# Patient Record
Sex: FEMALE | Race: WHITE | NOT HISPANIC OR LATINO | Employment: OTHER | URBAN - METROPOLITAN AREA
[De-identification: names, ages, dates, MRNs, and addresses within clinical notes are randomized per-mention and may not be internally consistent; named-entity substitution may affect disease eponyms.]

---

## 2019-02-12 PROBLEM — Z87.19 HISTORY OF LIVER FAILURE: Status: ACTIVE | Noted: 2019-02-12

## 2019-02-27 PROBLEM — F31.9 BIPOLAR DISORDER (HCC): Status: ACTIVE | Noted: 2019-02-27

## 2019-02-27 PROBLEM — K80.20 CHOLELITHIASIS: Status: ACTIVE | Noted: 2019-02-27

## 2019-03-05 PROBLEM — F31.62 BIPOLAR DISORDER, CURRENT EPISODE MIXED, MODERATE (HCC): Status: ACTIVE | Noted: 2019-03-05

## 2019-03-18 PROBLEM — K80.20 CHOLELITHIASIS: Status: RESOLVED | Noted: 2019-02-27 | Resolved: 2019-03-18

## 2019-04-25 ENCOUNTER — HOSPITAL ENCOUNTER (INPATIENT)
Facility: MEDICAL CENTER | Age: 39
LOS: 3 days | DRG: 682 | End: 2019-04-29
Attending: EMERGENCY MEDICINE | Admitting: INTERNAL MEDICINE
Payer: MEDICARE

## 2019-04-25 ENCOUNTER — HOSPITAL ENCOUNTER (OUTPATIENT)
Facility: MEDICAL CENTER | Age: 39
End: 2019-04-25

## 2019-04-25 VITALS — BODY MASS INDEX: 30.61 KG/M2 | WEIGHT: 213.85 LBS | HEIGHT: 70 IN

## 2019-04-25 DIAGNOSIS — N17.9 AKI (ACUTE KIDNEY INJURY) (HCC): ICD-10-CM

## 2019-04-25 DIAGNOSIS — F31.10 BIPOLAR AFFECTIVE DISORDER, CURRENT EPISODE MANIC, CURRENT EPISODE SEVERITY UNSPECIFIED (HCC): ICD-10-CM

## 2019-04-25 DIAGNOSIS — T56.891A LITHIUM TOXICITY, ACCIDENTAL OR UNINTENTIONAL, INITIAL ENCOUNTER: ICD-10-CM

## 2019-04-25 DIAGNOSIS — F31.62 BIPOLAR DISORDER, CURRENT EPISODE MIXED, MODERATE (HCC): ICD-10-CM

## 2019-04-25 PROBLEM — M54.9 CHRONIC BACK PAIN: Status: ACTIVE | Noted: 2019-04-25

## 2019-04-25 PROBLEM — R45.851 SUICIDAL IDEATION: Status: ACTIVE | Noted: 2019-04-25

## 2019-04-25 PROBLEM — G89.29 CHRONIC BACK PAIN: Status: ACTIVE | Noted: 2019-04-25

## 2019-04-25 PROBLEM — N17.0 ATN (ACUTE TUBULAR NECROSIS) (HCC): Status: ACTIVE | Noted: 2019-04-25

## 2019-04-25 LAB
ANION GAP SERPL CALC-SCNC: 7 MMOL/L (ref 0–11.9)
BASOPHILS # BLD AUTO: 0.2 % (ref 0–1.8)
BASOPHILS # BLD: 0.02 K/UL (ref 0–0.12)
BUN SERPL-MCNC: 23 MG/DL (ref 8–22)
CALCIUM SERPL-MCNC: 8.9 MG/DL (ref 8.5–10.5)
CHLORIDE SERPL-SCNC: 108 MMOL/L (ref 96–112)
CO2 SERPL-SCNC: 22 MMOL/L (ref 20–33)
CREAT SERPL-MCNC: 2.22 MG/DL (ref 0.5–1.4)
EOSINOPHIL # BLD AUTO: 0.37 K/UL (ref 0–0.51)
EOSINOPHIL NFR BLD: 4.2 % (ref 0–6.9)
ERYTHROCYTE [DISTWIDTH] IN BLOOD BY AUTOMATED COUNT: 50.2 FL (ref 35.9–50)
GLUCOSE SERPL-MCNC: 109 MG/DL (ref 65–99)
HCT VFR BLD AUTO: 37 % (ref 37–47)
HGB BLD-MCNC: 11.7 G/DL (ref 12–16)
IMM GRANULOCYTES # BLD AUTO: 0.02 K/UL (ref 0–0.11)
IMM GRANULOCYTES NFR BLD AUTO: 0.2 % (ref 0–0.9)
LITHIUM SERPL-MCNC: 2.6 MMOL/L (ref 0.6–1.2)
LYMPHOCYTES # BLD AUTO: 1.36 K/UL (ref 1–4.8)
LYMPHOCYTES NFR BLD: 15.5 % (ref 22–41)
MCH RBC QN AUTO: 26.9 PG (ref 27–33)
MCHC RBC AUTO-ENTMCNC: 31.6 G/DL (ref 33.6–35)
MCV RBC AUTO: 85.1 FL (ref 81.4–97.8)
MONOCYTES # BLD AUTO: 0.56 K/UL (ref 0–0.85)
MONOCYTES NFR BLD AUTO: 6.4 % (ref 0–13.4)
NEUTROPHILS # BLD AUTO: 6.44 K/UL (ref 2–7.15)
NEUTROPHILS NFR BLD: 73.5 % (ref 44–72)
NRBC # BLD AUTO: 0 K/UL
NRBC BLD-RTO: 0 /100 WBC
PLATELET # BLD AUTO: 165 K/UL (ref 164–446)
PMV BLD AUTO: 11.4 FL (ref 9–12.9)
POTASSIUM SERPL-SCNC: 3.1 MMOL/L (ref 3.6–5.5)
RBC # BLD AUTO: 4.35 M/UL (ref 4.2–5.4)
SODIUM SERPL-SCNC: 137 MMOL/L (ref 135–145)
WBC # BLD AUTO: 8.8 K/UL (ref 4.8–10.8)

## 2019-04-25 PROCEDURE — 80048 BASIC METABOLIC PNL TOTAL CA: CPT | Mod: 91

## 2019-04-25 PROCEDURE — 51702 INSERT TEMP BLADDER CATH: CPT

## 2019-04-25 PROCEDURE — 93005 ELECTROCARDIOGRAM TRACING: CPT | Performed by: EMERGENCY MEDICINE

## 2019-04-25 PROCEDURE — 99285 EMERGENCY DEPT VISIT HI MDM: CPT

## 2019-04-25 PROCEDURE — 84443 ASSAY THYROID STIM HORMONE: CPT

## 2019-04-25 PROCEDURE — 36415 COLL VENOUS BLD VENIPUNCTURE: CPT

## 2019-04-25 PROCEDURE — 85025 COMPLETE CBC W/AUTO DIFF WBC: CPT | Mod: 91

## 2019-04-25 PROCEDURE — 84439 ASSAY OF FREE THYROXINE: CPT

## 2019-04-25 PROCEDURE — G0378 HOSPITAL OBSERVATION PER HR: HCPCS

## 2019-04-25 PROCEDURE — 700105 HCHG RX REV CODE 258: Performed by: INTERNAL MEDICINE

## 2019-04-25 PROCEDURE — 303105 HCHG CATHETER EXTRA

## 2019-04-25 PROCEDURE — 700111 HCHG RX REV CODE 636 W/ 250 OVERRIDE (IP): Performed by: EMERGENCY MEDICINE

## 2019-04-25 PROCEDURE — 80178 ASSAY OF LITHIUM: CPT | Mod: 91

## 2019-04-25 RX ORDER — POTASSIUM CHLORIDE 7.45 MG/ML
10 INJECTION INTRAVENOUS ONCE
Status: COMPLETED | OUTPATIENT
Start: 2019-04-25 | End: 2019-04-26

## 2019-04-25 RX ORDER — SODIUM CHLORIDE 9 MG/ML
INJECTION, SOLUTION INTRAVENOUS CONTINUOUS
Status: CANCELLED | OUTPATIENT
Start: 2019-04-25

## 2019-04-25 RX ORDER — HEPARIN SODIUM 5000 [USP'U]/ML
5000 INJECTION, SOLUTION INTRAVENOUS; SUBCUTANEOUS EVERY 8 HOURS
Status: DISCONTINUED | OUTPATIENT
Start: 2019-04-26 | End: 2019-04-29 | Stop reason: HOSPADM

## 2019-04-25 RX ORDER — BISACODYL 10 MG
10 SUPPOSITORY, RECTAL RECTAL
Status: DISCONTINUED | OUTPATIENT
Start: 2019-04-25 | End: 2019-04-29 | Stop reason: HOSPADM

## 2019-04-25 RX ORDER — AMOXICILLIN 250 MG
2 CAPSULE ORAL 2 TIMES DAILY
Status: DISCONTINUED | OUTPATIENT
Start: 2019-04-26 | End: 2019-04-29 | Stop reason: HOSPADM

## 2019-04-25 RX ORDER — POLYETHYLENE GLYCOL 3350 17 G/17G
1 POWDER, FOR SOLUTION ORAL
Status: DISCONTINUED | OUTPATIENT
Start: 2019-04-25 | End: 2019-04-29 | Stop reason: HOSPADM

## 2019-04-25 RX ORDER — POTASSIUM CHLORIDE 20 MEQ/1
40 TABLET, EXTENDED RELEASE ORAL ONCE
Status: COMPLETED | OUTPATIENT
Start: 2019-04-26 | End: 2019-04-26

## 2019-04-25 RX ORDER — SODIUM CHLORIDE AND POTASSIUM CHLORIDE 150; 900 MG/100ML; MG/100ML
INJECTION, SOLUTION INTRAVENOUS CONTINUOUS
Status: DISCONTINUED | OUTPATIENT
Start: 2019-04-26 | End: 2019-04-26

## 2019-04-25 RX ORDER — ACETAMINOPHEN 325 MG/1
650 TABLET ORAL EVERY 6 HOURS PRN
Status: DISCONTINUED | OUTPATIENT
Start: 2019-04-25 | End: 2019-04-27

## 2019-04-25 RX ORDER — SODIUM CHLORIDE 9 MG/ML
INJECTION, SOLUTION INTRAVENOUS CONTINUOUS
Status: DISCONTINUED | OUTPATIENT
Start: 2019-04-25 | End: 2019-04-26

## 2019-04-25 RX ADMIN — SODIUM CHLORIDE: 9 INJECTION, SOLUTION INTRAVENOUS at 21:15

## 2019-04-25 RX ADMIN — POTASSIUM CHLORIDE 10 MEQ: 7.46 INJECTION, SOLUTION INTRAVENOUS at 23:53

## 2019-04-25 ASSESSMENT — LIFESTYLE VARIABLES: DO YOU DRINK ALCOHOL: NO

## 2019-04-26 ENCOUNTER — APPOINTMENT (OUTPATIENT)
Dept: RADIOLOGY | Facility: MEDICAL CENTER | Age: 39
DRG: 682 | End: 2019-04-26
Attending: STUDENT IN AN ORGANIZED HEALTH CARE EDUCATION/TRAINING PROGRAM
Payer: MEDICARE

## 2019-04-26 ENCOUNTER — HOSPITAL ENCOUNTER (OUTPATIENT)
Dept: RADIOLOGY | Facility: MEDICAL CENTER | Age: 39
End: 2019-04-26

## 2019-04-26 PROBLEM — Z91.89 HISTORY OF SUICIDAL TENDENCIES: Status: ACTIVE | Noted: 2019-04-25

## 2019-04-26 PROBLEM — E87.6 HYPOKALEMIA: Status: ACTIVE | Noted: 2019-04-26

## 2019-04-26 PROBLEM — Z86.2 HISTORY OF IRON DEFICIENCY ANEMIA: Status: ACTIVE | Noted: 2019-04-26

## 2019-04-26 PROBLEM — G93.41 ACUTE METABOLIC ENCEPHALOPATHY: Status: ACTIVE | Noted: 2019-04-26

## 2019-04-26 PROBLEM — N17.9 ACUTE KIDNEY INJURY (HCC): Status: ACTIVE | Noted: 2019-04-25

## 2019-04-26 PROBLEM — T56.891A LITHIUM TOXICITY: Chronic | Status: ACTIVE | Noted: 2019-04-25

## 2019-04-26 PROBLEM — Z87.19 HISTORY OF LIVER FAILURE: Status: RESOLVED | Noted: 2019-02-12 | Resolved: 2019-04-26

## 2019-04-26 PROBLEM — D17.71 ANGIOMYOLIPOMA OF RIGHT KIDNEY: Status: ACTIVE | Noted: 2019-04-26

## 2019-04-26 LAB
ALBUMIN SERPL BCP-MCNC: 3.6 G/DL (ref 3.2–4.9)
ALBUMIN/GLOB SERPL: 2.1 G/DL
ALP SERPL-CCNC: 102 U/L (ref 30–99)
ALT SERPL-CCNC: 21 U/L (ref 2–50)
ANION GAP SERPL CALC-SCNC: 3 MMOL/L (ref 0–11.9)
ANION GAP SERPL CALC-SCNC: 6 MMOL/L (ref 0–11.9)
ANION GAP SERPL CALC-SCNC: 7 MMOL/L (ref 0–11.9)
APPEARANCE UR: CLEAR
AST SERPL-CCNC: 23 U/L (ref 12–45)
BACTERIA #/AREA URNS HPF: NEGATIVE /HPF
BASOPHILS # BLD AUTO: 0.3 % (ref 0–1.8)
BASOPHILS # BLD: 0.02 K/UL (ref 0–0.12)
BILIRUB SERPL-MCNC: 0.4 MG/DL (ref 0.1–1.5)
BILIRUB UR QL STRIP.AUTO: NEGATIVE
BUN SERPL-MCNC: 15 MG/DL (ref 8–22)
BUN SERPL-MCNC: 18 MG/DL (ref 8–22)
BUN SERPL-MCNC: 20 MG/DL (ref 8–22)
CALCIUM SERPL-MCNC: 8.5 MG/DL (ref 8.5–10.5)
CALCIUM SERPL-MCNC: 8.6 MG/DL (ref 8.5–10.5)
CALCIUM SERPL-MCNC: 9.1 MG/DL (ref 8.5–10.5)
CHLORIDE SERPL-SCNC: 113 MMOL/L (ref 96–112)
CHLORIDE SERPL-SCNC: 116 MMOL/L (ref 96–112)
CHLORIDE SERPL-SCNC: 118 MMOL/L (ref 96–112)
CO2 SERPL-SCNC: 14 MMOL/L (ref 20–33)
CO2 SERPL-SCNC: 17 MMOL/L (ref 20–33)
CO2 SERPL-SCNC: 18 MMOL/L (ref 20–33)
COLOR UR: YELLOW
CREAT SERPL-MCNC: 1.4 MG/DL (ref 0.5–1.4)
CREAT SERPL-MCNC: 1.54 MG/DL (ref 0.5–1.4)
CREAT SERPL-MCNC: 1.76 MG/DL (ref 0.5–1.4)
EKG IMPRESSION: NORMAL
EOSINOPHIL # BLD AUTO: 0.42 K/UL (ref 0–0.51)
EOSINOPHIL NFR BLD: 6.4 % (ref 0–6.9)
EPI CELLS #/AREA URNS HPF: ABNORMAL /HPF
ERYTHROCYTE [DISTWIDTH] IN BLOOD BY AUTOMATED COUNT: 49.4 FL (ref 35.9–50)
FERRITIN SERPL-MCNC: 13.5 NG/ML (ref 10–291)
FOLATE SERPL-MCNC: 19.5 NG/ML
GLOBULIN SER CALC-MCNC: 1.7 G/DL (ref 1.9–3.5)
GLUCOSE SERPL-MCNC: 117 MG/DL (ref 65–99)
GLUCOSE SERPL-MCNC: 117 MG/DL (ref 65–99)
GLUCOSE SERPL-MCNC: 144 MG/DL (ref 65–99)
GLUCOSE UR STRIP.AUTO-MCNC: NEGATIVE MG/DL
HCG UR QL: NEGATIVE
HCT VFR BLD AUTO: 30.6 % (ref 37–47)
HGB BLD-MCNC: 9.8 G/DL (ref 12–16)
HGB RETIC QN AUTO: 30.7 PG/CELL (ref 29–35)
IMM GRANULOCYTES # BLD AUTO: 0.02 K/UL (ref 0–0.11)
IMM GRANULOCYTES NFR BLD AUTO: 0.3 % (ref 0–0.9)
IMM RETICS NFR: 10.9 % (ref 9.3–17.4)
IRON SATN MFR SERPL: 22 % (ref 15–55)
IRON SERPL-MCNC: 84 UG/DL (ref 40–170)
KETONES UR STRIP.AUTO-MCNC: NEGATIVE MG/DL
LEUKOCYTE ESTERASE UR QL STRIP.AUTO: ABNORMAL
LITHIUM SERPL-MCNC: 2.2 MMOL/L (ref 0.6–1.2)
LITHIUM SERPL-MCNC: 2.4 MMOL/L (ref 0.6–1.2)
LITHIUM SERPL-MCNC: 2.5 MMOL/L (ref 0.6–1.2)
LYMPHOCYTES # BLD AUTO: 0.86 K/UL (ref 1–4.8)
LYMPHOCYTES NFR BLD: 13.1 % (ref 22–41)
MAGNESIUM SERPL-MCNC: 1.8 MG/DL (ref 1.5–2.5)
MCH RBC QN AUTO: 27.1 PG (ref 27–33)
MCHC RBC AUTO-ENTMCNC: 32 G/DL (ref 33.6–35)
MCV RBC AUTO: 84.5 FL (ref 81.4–97.8)
MICRO URNS: ABNORMAL
MONOCYTES # BLD AUTO: 0.55 K/UL (ref 0–0.85)
MONOCYTES NFR BLD AUTO: 8.3 % (ref 0–13.4)
NEUTROPHILS # BLD AUTO: 4.72 K/UL (ref 2–7.15)
NEUTROPHILS NFR BLD: 71.6 % (ref 44–72)
NITRITE UR QL STRIP.AUTO: NEGATIVE
NRBC # BLD AUTO: 0 K/UL
NRBC BLD-RTO: 0 /100 WBC
PH UR STRIP.AUTO: 6.5 [PH]
PHOSPHATE SERPL-MCNC: 3.3 MG/DL (ref 2.5–4.5)
PLATELET # BLD AUTO: 134 K/UL (ref 164–446)
PMV BLD AUTO: 11.4 FL (ref 9–12.9)
POTASSIUM SERPL-SCNC: 3.5 MMOL/L (ref 3.6–5.5)
POTASSIUM SERPL-SCNC: 3.5 MMOL/L (ref 3.6–5.5)
POTASSIUM SERPL-SCNC: 3.7 MMOL/L (ref 3.6–5.5)
PROT SERPL-MCNC: 5.3 G/DL (ref 6–8.2)
PROT UR QL STRIP: NEGATIVE MG/DL
RBC # BLD AUTO: 3.62 M/UL (ref 4.2–5.4)
RBC # URNS HPF: ABNORMAL /HPF
RBC UR QL AUTO: ABNORMAL
RETICS # AUTO: 0.05 M/UL (ref 0.04–0.06)
RETICS/RBC NFR: 1.4 % (ref 0.8–2.1)
SODIUM SERPL-SCNC: 137 MMOL/L (ref 135–145)
SODIUM SERPL-SCNC: 137 MMOL/L (ref 135–145)
SODIUM SERPL-SCNC: 138 MMOL/L (ref 135–145)
SP GR UR REFRACTOMETRY: 1.01
SP GR UR STRIP.AUTO: 1.01
T4 FREE SERPL-MCNC: 0.58 NG/DL (ref 0.53–1.43)
TIBC SERPL-MCNC: 386 UG/DL (ref 250–450)
TSH SERPL DL<=0.005 MIU/L-ACNC: 2.05 UIU/ML (ref 0.38–5.33)
UROBILINOGEN UR STRIP.AUTO-MCNC: 0.2 MG/DL
VIT B12 SERPL-MCNC: 186 PG/ML (ref 211–911)
WBC # BLD AUTO: 6.6 K/UL (ref 4.8–10.8)
WBC #/AREA URNS HPF: ABNORMAL /HPF

## 2019-04-26 PROCEDURE — 96372 THER/PROPH/DIAG INJ SC/IM: CPT

## 2019-04-26 PROCEDURE — 82941 ASSAY OF GASTRIN: CPT

## 2019-04-26 PROCEDURE — 82728 ASSAY OF FERRITIN: CPT

## 2019-04-26 PROCEDURE — 85025 COMPLETE CBC W/AUTO DIFF WBC: CPT

## 2019-04-26 PROCEDURE — 80178 ASSAY OF LITHIUM: CPT | Mod: 91

## 2019-04-26 PROCEDURE — 82746 ASSAY OF FOLIC ACID SERUM: CPT

## 2019-04-26 PROCEDURE — 83540 ASSAY OF IRON: CPT

## 2019-04-26 PROCEDURE — 93005 ELECTROCARDIOGRAM TRACING: CPT | Performed by: STUDENT IN AN ORGANIZED HEALTH CARE EDUCATION/TRAINING PROGRAM

## 2019-04-26 PROCEDURE — 93010 ELECTROCARDIOGRAM REPORT: CPT | Performed by: INTERNAL MEDICINE

## 2019-04-26 PROCEDURE — 71045 X-RAY EXAM CHEST 1 VIEW: CPT

## 2019-04-26 PROCEDURE — 84100 ASSAY OF PHOSPHORUS: CPT

## 2019-04-26 PROCEDURE — 700101 HCHG RX REV CODE 250: Performed by: STUDENT IN AN ORGANIZED HEALTH CARE EDUCATION/TRAINING PROGRAM

## 2019-04-26 PROCEDURE — 82784 ASSAY IGA/IGD/IGG/IGM EACH: CPT

## 2019-04-26 PROCEDURE — A9270 NON-COVERED ITEM OR SERVICE: HCPCS | Performed by: STUDENT IN AN ORGANIZED HEALTH CARE EDUCATION/TRAINING PROGRAM

## 2019-04-26 PROCEDURE — 85046 RETICYTE/HGB CONCENTRATE: CPT

## 2019-04-26 PROCEDURE — 83550 IRON BINDING TEST: CPT

## 2019-04-26 PROCEDURE — 700102 HCHG RX REV CODE 250 W/ 637 OVERRIDE(OP): Performed by: STUDENT IN AN ORGANIZED HEALTH CARE EDUCATION/TRAINING PROGRAM

## 2019-04-26 PROCEDURE — 96365 THER/PROPH/DIAG IV INF INIT: CPT

## 2019-04-26 PROCEDURE — 99223 1ST HOSP IP/OBS HIGH 75: CPT | Mod: GC | Performed by: INTERNAL MEDICINE

## 2019-04-26 PROCEDURE — 81001 URINALYSIS AUTO W/SCOPE: CPT

## 2019-04-26 PROCEDURE — 70450 CT HEAD/BRAIN W/O DYE: CPT

## 2019-04-26 PROCEDURE — 700111 HCHG RX REV CODE 636 W/ 250 OVERRIDE (IP)

## 2019-04-26 PROCEDURE — 82607 VITAMIN B-12: CPT

## 2019-04-26 PROCEDURE — 81025 URINE PREGNANCY TEST: CPT

## 2019-04-26 PROCEDURE — 700111 HCHG RX REV CODE 636 W/ 250 OVERRIDE (IP): Performed by: STUDENT IN AN ORGANIZED HEALTH CARE EDUCATION/TRAINING PROGRAM

## 2019-04-26 PROCEDURE — 770020 HCHG ROOM/CARE - TELE (206)

## 2019-04-26 PROCEDURE — 99222 1ST HOSP IP/OBS MODERATE 55: CPT | Performed by: INTERNAL MEDICINE

## 2019-04-26 PROCEDURE — 36415 COLL VENOUS BLD VENIPUNCTURE: CPT

## 2019-04-26 PROCEDURE — 80053 COMPREHEN METABOLIC PANEL: CPT

## 2019-04-26 PROCEDURE — 80048 BASIC METABOLIC PNL TOTAL CA: CPT | Mod: 91

## 2019-04-26 PROCEDURE — 83735 ASSAY OF MAGNESIUM: CPT

## 2019-04-26 RX ORDER — GABAPENTIN 300 MG/1
300 CAPSULE ORAL
COMMUNITY
End: 2019-05-31

## 2019-04-26 RX ORDER — HALOPERIDOL 5 MG/ML
5 INJECTION INTRAMUSCULAR EVERY 6 HOURS PRN
Status: DISCONTINUED | OUTPATIENT
Start: 2019-04-26 | End: 2019-04-29 | Stop reason: HOSPADM

## 2019-04-26 RX ORDER — CLONAZEPAM 1 MG/1
1 TABLET ORAL 2 TIMES DAILY
Status: DISCONTINUED | OUTPATIENT
Start: 2019-04-26 | End: 2019-04-27

## 2019-04-26 RX ORDER — LIDOCAINE 50 MG/G
1 PATCH TOPICAL EVERY 24 HOURS
Status: DISCONTINUED | OUTPATIENT
Start: 2019-04-26 | End: 2019-04-29 | Stop reason: HOSPADM

## 2019-04-26 RX ORDER — QUETIAPINE FUMARATE 200 MG/1
400 TABLET, FILM COATED ORAL NIGHTLY
Status: DISCONTINUED | OUTPATIENT
Start: 2019-04-26 | End: 2019-04-29 | Stop reason: HOSPADM

## 2019-04-26 RX ORDER — QUETIAPINE FUMARATE 200 MG/1
200 TABLET, FILM COATED ORAL
Status: ON HOLD | COMMUNITY
End: 2019-04-29

## 2019-04-26 RX ORDER — SODIUM CHLORIDE AND POTASSIUM CHLORIDE 150; 900 MG/100ML; MG/100ML
INJECTION, SOLUTION INTRAVENOUS CONTINUOUS
Status: DISCONTINUED | OUTPATIENT
Start: 2019-04-26 | End: 2019-04-27

## 2019-04-26 RX ORDER — QUETIAPINE FUMARATE 200 MG/1
400 TABLET, FILM COATED ORAL NIGHTLY
Status: DISCONTINUED | OUTPATIENT
Start: 2019-04-26 | End: 2019-04-26

## 2019-04-26 RX ORDER — LORAZEPAM 2 MG/ML
2 INJECTION INTRAMUSCULAR ONCE
Status: COMPLETED | OUTPATIENT
Start: 2019-04-26 | End: 2019-04-26

## 2019-04-26 RX ORDER — SODIUM CHLORIDE AND POTASSIUM CHLORIDE 150; 900 MG/100ML; MG/100ML
2000 INJECTION, SOLUTION INTRAVENOUS CONTINUOUS
Status: DISCONTINUED | OUTPATIENT
Start: 2019-04-26 | End: 2019-04-26

## 2019-04-26 RX ORDER — POTASSIUM CHLORIDE 20 MEQ/1
40 TABLET, EXTENDED RELEASE ORAL ONCE
Status: COMPLETED | OUTPATIENT
Start: 2019-04-26 | End: 2019-04-26

## 2019-04-26 RX ORDER — FERROUS SULFATE 325(65) MG
325 TABLET ORAL
Status: DISCONTINUED | OUTPATIENT
Start: 2019-04-26 | End: 2019-04-29 | Stop reason: HOSPADM

## 2019-04-26 RX ORDER — LITHIUM CARBONATE 300 MG/1
300 CAPSULE ORAL 2 TIMES DAILY
Status: ON HOLD | COMMUNITY
End: 2019-04-29

## 2019-04-26 RX ORDER — CYANOCOBALAMIN 1000 UG/ML
1000 INJECTION, SOLUTION INTRAMUSCULAR; SUBCUTANEOUS
Status: DISCONTINUED | OUTPATIENT
Start: 2019-04-26 | End: 2019-04-29 | Stop reason: HOSPADM

## 2019-04-26 RX ORDER — LORAZEPAM 2 MG/ML
INJECTION INTRAMUSCULAR
Status: COMPLETED
Start: 2019-04-26 | End: 2019-04-26

## 2019-04-26 RX ORDER — QUETIAPINE FUMARATE 400 MG/1
800 TABLET, FILM COATED ORAL
Status: ON HOLD | COMMUNITY
End: 2019-04-29

## 2019-04-26 RX ADMIN — HALOPERIDOL LACTATE 5 MG: 5 INJECTION, SOLUTION INTRAMUSCULAR at 16:59

## 2019-04-26 RX ADMIN — HEPARIN SODIUM 5000 UNITS: 5000 INJECTION, SOLUTION INTRAVENOUS; SUBCUTANEOUS at 12:52

## 2019-04-26 RX ADMIN — LORAZEPAM 2 MG: 2 INJECTION INTRAMUSCULAR; INTRAVENOUS at 16:15

## 2019-04-26 RX ADMIN — FERROUS SULFATE TAB 325 MG (65 MG ELEMENTAL FE) 325 MG: 325 (65 FE) TAB at 12:58

## 2019-04-26 RX ADMIN — POTASSIUM CHLORIDE AND SODIUM CHLORIDE: 900; 150 INJECTION, SOLUTION INTRAVENOUS at 23:54

## 2019-04-26 RX ADMIN — POTASSIUM CHLORIDE 40 MEQ: 1500 TABLET, EXTENDED RELEASE ORAL at 01:27

## 2019-04-26 RX ADMIN — CLONAZEPAM 1 MG: 1 TABLET ORAL at 01:26

## 2019-04-26 RX ADMIN — CLONAZEPAM 1 MG: 1 TABLET ORAL at 20:00

## 2019-04-26 RX ADMIN — LIDOCAINE 1 PATCH: 50 PATCH TOPICAL at 12:56

## 2019-04-26 RX ADMIN — POTASSIUM CHLORIDE AND SODIUM CHLORIDE: 900; 150 INJECTION, SOLUTION INTRAVENOUS at 13:02

## 2019-04-26 RX ADMIN — POTASSIUM CHLORIDE AND SODIUM CHLORIDE: 900; 150 INJECTION, SOLUTION INTRAVENOUS at 18:42

## 2019-04-26 RX ADMIN — POTASSIUM CHLORIDE 40 MEQ: 1500 TABLET, EXTENDED RELEASE ORAL at 04:00

## 2019-04-26 RX ADMIN — HEPARIN SODIUM 5000 UNITS: 5000 INJECTION, SOLUTION INTRAVENOUS; SUBCUTANEOUS at 21:51

## 2019-04-26 RX ADMIN — CYANOCOBALAMIN 1000 MCG: 1000 INJECTION, SOLUTION INTRAMUSCULAR; SUBCUTANEOUS at 12:53

## 2019-04-26 RX ADMIN — LORAZEPAM 2 MG: 2 INJECTION INTRAMUSCULAR at 16:15

## 2019-04-26 RX ADMIN — POTASSIUM CHLORIDE AND SODIUM CHLORIDE 2000 ML: 900; 150 INJECTION, SOLUTION INTRAVENOUS at 01:26

## 2019-04-26 RX ADMIN — POTASSIUM CHLORIDE 40 MEQ: 1500 TABLET, EXTENDED RELEASE ORAL at 12:54

## 2019-04-26 RX ADMIN — QUETIAPINE FUMARATE 400 MG: 200 TABLET ORAL at 16:22

## 2019-04-26 ASSESSMENT — COGNITIVE AND FUNCTIONAL STATUS - GENERAL
CLIMB 3 TO 5 STEPS WITH RAILING: A LITTLE
PERSONAL GROOMING: A LITTLE
SUGGESTED CMS G CODE MODIFIER MOBILITY: CJ
HELP NEEDED FOR BATHING: A LITTLE
WALKING IN HOSPITAL ROOM: A LITTLE
MOBILITY SCORE: 20
MOVING FROM LYING ON BACK TO SITTING ON SIDE OF FLAT BED: A LITTLE
TOILETING: A LITTLE
DAILY ACTIVITIY SCORE: 21
SUGGESTED CMS G CODE MODIFIER DAILY ACTIVITY: CJ
STANDING UP FROM CHAIR USING ARMS: A LITTLE

## 2019-04-26 ASSESSMENT — COPD QUESTIONNAIRES
HAVE YOU SMOKED AT LEAST 100 CIGARETTES IN YOUR ENTIRE LIFE: YES
DURING THE PAST 4 WEEKS HOW MUCH DID YOU FEEL SHORT OF BREATH: NONE/LITTLE OF THE TIME
COPD SCREENING SCORE: 2
DO YOU EVER COUGH UP ANY MUCUS OR PHLEGM?: NO/ONLY WITH OCCASIONAL COLDS OR INFECTIONS

## 2019-04-26 ASSESSMENT — PATIENT HEALTH QUESTIONNAIRE - PHQ9
5. POOR APPETITE OR OVEREATING: NOT AT ALL
4. FEELING TIRED OR HAVING LITTLE ENERGY: NEARLY EVERY DAY
7. TROUBLE CONCENTRATING ON THINGS, SUCH AS READING THE NEWSPAPER OR WATCHING TELEVISION: NEARLY EVERY DAY
6. FEELING BAD ABOUT YOURSELF - OR THAT YOU ARE A FAILURE OR HAVE LET YOURSELF OR YOUR FAMILY DOWN: NOT AL ALL
SUM OF ALL RESPONSES TO PHQ9 QUESTIONS 1 AND 2: 2
8. MOVING OR SPEAKING SO SLOWLY THAT OTHER PEOPLE COULD HAVE NOTICED. OR THE OPPOSITE, BEING SO FIGETY OR RESTLESS THAT YOU HAVE BEEN MOVING AROUND A LOT MORE THAN USUAL: NOT AT ALL
9. THOUGHTS THAT YOU WOULD BE BETTER OFF DEAD, OR OF HURTING YOURSELF: NOT AT ALL
2. FEELING DOWN, DEPRESSED, IRRITABLE, OR HOPELESS: SEVERAL DAYS
SUM OF ALL RESPONSES TO PHQ QUESTIONS 1-9: 10
3. TROUBLE FALLING OR STAYING ASLEEP OR SLEEPING TOO MUCH: MORE THAN HALF THE DAYS
1. LITTLE INTEREST OR PLEASURE IN DOING THINGS: SEVERAL DAYS

## 2019-04-26 ASSESSMENT — ENCOUNTER SYMPTOMS
EYE REDNESS: 0
FALLS: 0
COUGH: 0
FOCAL WEAKNESS: 0
HEADACHES: 0
DIZZINESS: 0
NERVOUS/ANXIOUS: 0
FLANK PAIN: 0
SORE THROAT: 0
FEVER: 0
MEMORY LOSS: 1
HALLUCINATIONS: 1
SHORTNESS OF BREATH: 0
ORTHOPNEA: 0
TREMORS: 1
NECK PAIN: 0
BLURRED VISION: 0
DOUBLE VISION: 0
DIAPHORESIS: 0
SPUTUM PRODUCTION: 0
DEPRESSION: 0
TINGLING: 0
TREMORS: 0
NAUSEA: 1
HEARTBURN: 0
HEADACHES: 1
NAUSEA: 0
ABDOMINAL PAIN: 0
VOMITING: 0
POLYDIPSIA: 1
PALPITATIONS: 1
MYALGIAS: 0
EYE PAIN: 0
BACK PAIN: 1
CHILLS: 0
HEMOPTYSIS: 0
WEAKNESS: 1

## 2019-04-26 ASSESSMENT — LIFESTYLE VARIABLES
EVER_SMOKED: YES
EVER_SMOKED: YES
SUBSTANCE_ABUSE: 0

## 2019-04-26 NOTE — CONSULTS
Consults   Nephrology Inpatient Consultation    Date of Service  4/26/2019    Reason for Consultation  Li intoxication, TAMIKO    History of Presenting Illness  38 y.o. female admitted 4/25/2019 with Li intoxication and TAMIKO. Patient denies having renal failure, and labs from Feb note normal Cr. She does note that she has high volume urine output. She states that she was not feeling herself and took more Li than prescribed to feel better. Not clear but seems to feel this was 4-5 pills of lithium. She presented with Li of 3.4, and Cr of 2.7. She was given 4L as OSH, and subsequently had more urine output. Cr has been improving to 1.76 and Li level down to 2.5 today.     Referring Physician  Francisco Jauregui M.D.    Consulting Physician  Hu Sousa M.D.    Review of Systems  Review of Systems   Unable to perform ROS: Psychiatric disorder      Past Medical History  Past Medical History:   Diagnosis Date   • Allergy    • Anemia    • Anxiety    • Arthritis    • At risk for sleep apnea    • Bipolar disorder (HCC)    • Depression    • Head ache    • Jaundice     Dec 2018   • Renal disorder    • Substance abuse (HCC)     heroin quit 2003       Surgical History  Past Surgical History:   Procedure Laterality Date   • FENG BY LAPAROSCOPY  3/14/2019    Procedure: CHOLECYSTECTOMY WITH CHOLANGIOGRAMS LAPAROSCOPIC;  Surgeon: Niels Flannery D.O.;  Location: SURGERY [facility];  Service: General   • OPEN REDUCTION         Medications  No current facility-administered medications on file prior to encounter.      Current Outpatient Prescriptions on File Prior to Encounter   Medication Sig Dispense Refill   • lithium carbonate 300 MG capsule Take one daily for one week then twice a day 60 Cap 3   • clonazePAM (KLONOPIN) 1 MG Tab Take bid 12 hours apart to last 30 days 60 Tab 2   • ferrous sulfate 325 (65 Fe) MG EC tablet Take 1 Tab by mouth 3 times a day, with meals. 90 Tab 0       Family History  family history includes  Arthritis in her mother; Heart Disease in her maternal aunt.    Social History  Social History   Substance Use Topics   • Smoking status: Former Smoker     Packs/day: 1.00     Years: 24.00     Types: Cigarettes     Quit date: 2018   • Smokeless tobacco: Never Used   • Alcohol use No      Comment: quit in 2017       Allergies  Allergies   Allergen Reactions   • Bee Swelling        Physical Exam  Laboratory/Imaging   Hemodynamics  Temp (24hrs), Av.8 °C (98.2 °F), Min:36.4 °C (97.6 °F), Max:37 °C (98.6 °F)   Temperature: 36.8 °C (98.3 °F)  Pulse  Av.3  Min: 78  Max: 92 Heart Rate (Monitored): 78  Blood Pressure: 118/75, NIBP: 108/64      Respiratory      Respiration: 18, Pulse Oximetry: 96 %     Work Of Breathing / Effort: Mild  RUL Breath Sounds: Clear;Diminished, RML Breath Sounds: Clear, RLL Breath Sounds: Clear;Diminished, BRENDA Breath Sounds: Diminished, LLL Breath Sounds: Diminished    Fluids       Nutrition  Orders Placed This Encounter   Procedures   • Diet Order Regular     Standing Status:   Standing     Number of Occurrences:   1     Order Specific Question:   Diet:     Answer:   Regular [1]       Physical Exam   Constitutional: She appears well-developed and well-nourished.   HENT:   Head: Normocephalic and atraumatic.   Dry mouth   Eyes: Pupils are equal, round, and reactive to light. Conjunctivae are normal.   Cardiovascular: Normal rate and regular rhythm.    Pulmonary/Chest: Effort normal and breath sounds normal.   Abdominal: Soft. Bowel sounds are normal.   Musculoskeletal: She exhibits no edema or tenderness.   Neurological: She is alert.   Skin: Skin is warm and dry.       Recent Labs      19   1240  19   2040  19   0442   WBC  10.4  8.8  6.6   RBC  4.46  4.35  3.62*   HEMOGLOBIN  11.9*  11.7*  9.8*   HEMATOCRIT  37.0*  37.0  30.6*   MCV  83.0  85.1  84.5   MCH  26.7*  26.9*  27.1   MCHC  32.2*  31.6*  32.0*   RDW  15.9*  50.2*  49.4   PLATELETCT  180  165  134*   MPV   11.0*  11.4  11.4     Recent Labs      04/25/19   1845  04/25/19   2040  04/26/19   0442   SODIUM  137  137  137   POTASSIUM  3.2*  3.1*  3.5*   CHLORIDE  106  108  113*   CO2  19*  22  18*   GLUCOSE  88  109*  117*   BUN  24*  23*  20   CREATININE  2.5*  2.22*  1.76*   CALCIUM  8.8  8.9  8.5                      Assessment/Plan     1. TAMIKO - Looks to be prerenal, improving with IVF, now at a high rate. Still with some dry mouth. Urine output does not appear to be high volume at this time. Expect continued improvement.  2. Li intoxication - unclear history, though indicates she took extra Lithium. Improving, and expect to continue to improve at this rate. Given degree of improvement with IVF and simultaneous TAMIKO improvement, no indication for dialysis at this time. Expect resolution shortly.  3. Hyperchloremic acidosis - from high volume saline infusion    -At this time, no need for dialysis and I do not anticipate a need for dialysis  -Replete electrolytes as needed  -Will sign off, please call with any issues

## 2019-04-26 NOTE — ASSESSMENT & PLAN NOTE
Secondary to Lithium toxicity in the setting of NSAID use, back to baseline. Renal ultrasound showed possible nephrolithiasis but patient does not have any corresponding symptoms.   - I/Os  - Continue to monitor kidney function and electrolytes

## 2019-04-26 NOTE — PROGRESS NOTES
During belongings inventory it was found that patient has 2 bottles of medication. One tall bottle labeled Lithium Carbonate under patients name. One bottle was empty labeled ibuprofen under Bailey Tripp's name. One bottle with mixed pills in it lab led hydrocodone under Bartolome Tripp. All meds sent to pharmacy.  14 [place] lighter confiscated also. This is locked in patient drawer.

## 2019-04-26 NOTE — ASSESSMENT & PLAN NOTE
Improved. Patient is a 38-year old female with history of bipolar disorder who presents to the hospital with lithium toxicity and acute renal failure. Toxicity may be from excessive lithium ingestion in combination with continued NSAID use. On exam patient is tremulous but does not exhibit dysarthria or other extrapyramidal symptoms. Nephrology was consulted and deemed no urgent need for dialysis. TSH WNL, On admission QTc 466  - Lithium trending down, back within therapeutic range 4/29 at 1.1  - Fall/Aspiration/Seizure precautions   - Continue Seroquel at current dose (400 mg daily) and Haldol 5 mg IM PRN Q6H for agitation.   - Psych on board, appreciate help.

## 2019-04-26 NOTE — PROGRESS NOTES
Spoke with poinson control. Labs reviewed. Poison control recc. That patients fluids should be running at a rate of x1.5 her maintenance rate and that a lithium level and bnp should be drawn now. And repeated q 6-8 hours thereafter.     Poison control left number for their dr [phone]

## 2019-04-26 NOTE — PROGRESS NOTES
· 2 RN skin check complete with HUGO Rosenberg.  · Devices in place: Gibbs.  · Skin assessed under devices .  · Confirmed pressure ulcers found on N/A.  · New potential pressure ulcers noted on N/A.   · The following interventions in place N/A.     Scattared bruising to entire body   Bilateral heels dry, red, and blanching.   Bilateral elbows red and blanching   Skin otherwise intact.

## 2019-04-26 NOTE — PROGRESS NOTES
Paged unr to update them on poison controls recc. And security called to room by sitter as patient is yelling and angry.  Waiting for call back

## 2019-04-26 NOTE — ED NOTES
Dasilva that was placed PTA changed out. Pt tolerated procedure well. Pt has 900 mL out from previous dasilva.

## 2019-04-26 NOTE — SENIOR ADMIT NOTE
[facility] SENIOR ADMIT NOTE:    Patient ID:   Name:             Kelly Arroyo   YOB: 1980  Age:                 38 y.o.  female   MRN:               [id number]                                                          Chief Complaint:   Referral from [facility] for  abnormal laboratory values of lithium levels,for higher level of care    History of Present Illness:    Mr. Arroyo is a 38 y.o. female with a PMHx of bipolar disorder with psychosis on Seroquel, history of suicidal ideation and self injury 1 day prior to presentation.  Illicit drug use and chronic low back.pain, presented to the emergency department after being referred from [facility] abnormal lab values.  Patient was evidently taken there for a mental evaluation by her mother and grandmother for worsening of her mental health, and expressions of suicidal ideation and reportedly inflicting self-harm by slamming her wrists into a car.  Patient was seen in the emergency department after normal hospital today, and stated that she has worsening of her low back pain, for which she takes Advil, and stated that she does not have any suicidal ideation or intent today but had  that in the past   Review of records at [facility] for mental evaluation, shows that she has been treated for a history of bipolar disorder with labs done on presentation initially showed a lithium level of 3.4 and ten 3.3 after being given 2 L of fluids initial creatinine that was 2.7 which came down to 2.5 after fluid infusion.  Nephrology was consulted by ED-Dr. Sousa of nephrology is on board and he recommended a total of 4 L of IV fluids, with strict input output charting.  Poison control was consulted by [facility], with poison control case #[id number], who recommends no indication for dialysis at this time.  Patient will be admitted to the medical floor with close cardiac monitoring, serial EKG, and monitoring of her  kidney function, for urgent hemodialysis for removal of toxic lithium levels if needed.Nephrology monitoring patient.    ROS: Positive for back pain and confusion,nausea tremors and headaches patient denies any fever. shortness of breath ,suicidal or homicidal ideation.    EX: Physical Exam   Constitutional:   mallampati-3   HENT:   Head: Normocephalic and atraumatic.   Eyes: Pupils are equal, round, and reactive to light.   Neck: Normal range of motion. Neck supple.   Cardiovascular: Normal rate and regular rhythm.  Exam reveals no gallop and no friction rub.    No murmur heard.  Pulmonary/Chest: Effort normal and breath sounds normal. No respiratory distress. She has no wheezes. She has no rales.   Abdominal: Soft. Bowel sounds are normal. She exhibits no distension and no mass. There is no tenderness. There is no rebound and no guarding.   Rectal exam skipped as patient was confused and not co-operative,   Musculoskeletal: Normal range of motion. She exhibits no edema, tenderness or deformity.   Multiple abrasions to the left and right lower limbs and the dorsum of wrists   Neurological: She is alert. She displays normal reflexes. No cranial nerve deficit. She exhibits normal muscle tone. Coordination normal.   Disoriented x 2   Skin: Skin is warm and dry.   Psychiatric:   Confused(+),disoriented x 2     LABS: On presentation labs revealed a sodium of 132, potassium 3.3, chloride 104, bicarb 21 with a BUN at 26 and creatinine level at 2.7 with a GFR of 20 lithium levels were elevated at 3.3.  Beta hCG screen was negative, MRSA negative.  O  IMAGING: Renal ultrasound 4/25/2020 revealed a small nonobstructing stone in the right kidney    Active Ambulatory Problems     Diagnosis Date Noted   • History of liver failure 02/12/2019   • Bipolar disorder ([facility]) 02/27/2019   • Bipolar disorder, current episode mixed, moderate ([facility]) 03/05/2019   • ATN (acute tubular necrosis) ([facility]) 04/25/2019   • Suicidal ideation  "04/25/2019   • Chronic back pain 04/25/2019   • Lithium toxicity 04/25/2019     Resolved Ambulatory Problems     Diagnosis Date Noted   • Cholelithiasis 02/27/2019     Past Medical History:   Diagnosis Date   • Allergy    • Anemia    • Anxiety    • Arthritis    • At risk for sleep apnea    • Bipolar disorder (HCC)    • Depression    • Head ache    • Jaundice    • Renal disorder    • Substance abuse (HCC)        PHYSICAL EXAM  Vitals:   Weight/BMI: Body mass index is 30.68 kg/m².  /85   Pulse 83   Temp 37 °C (98.6 °F) (Temporal)   Resp 18   Ht 1.778 m (5' 10\")   Wt 97 kg (213 lb 13.5 oz)   SpO2 98%   Vitals:    04/25/19 2100 04/25/19 2130 04/25/19 2200 04/25/19 2300   BP:       Pulse:  85 84 83   Resp: 18 18 18 18   Temp:       TempSrc:       SpO2:  96% 98% 98%   Weight:       Height:         Oxygen Therapy:  Pulse Oximetry: 98 %, O2 Delivery: None (Room Air)  To follow the next all the need to follow    IMPRESSION  #Lithium toxicity.  #History of depression with suicidal ideation.  #History of substance abuse  #Back pain.    #ATN secondary to NSAIDs.    ASSESSMENT:  Lithium toxicity, to be managed on intravenous fluid loading, with hemodialysis needed if kidney function deteriorated.  Creatinine levels are dropping from an initial high of 2.7 to 2.5 and 2.22, with EGFR improving from 20 to 25.  Nephrology on board with low threshold for hemodialysis if renal function worsens, with intravenous fluids presently recommended as treatment by Poison Control Center.  May need mental health/psychiatry consult if patient developing suicidal thoughts or has intent.      PLAN:  -Admit on telemetry.  -Monitor patient closely, and initiate hemodialysis if serum lithium concentration is greater than 5mg/L or serum lithium concentration is greater than 4 and creatinine greater than 2 mg/dL on serum lithium greater than 2.5 with signs of lithium toxicity like seizures, after consultation with nephrology-Dr. vasquez of " nephrology is on board and is also closely following.  Monitor also for signs of altered mental status.  -If no seizures and altered mental status, and if lithium levels are less than 1.5 mg/L, patient states could be staying stable  Low threshold for transfer to ICU, if seizures develop,patient is mildly tremulous and confused now  - Given 2 L, normal saline stat,bolus with potasium replacements, followed by 125 cc/h maintenance fluids,continually.  -Monitor closely for kidney function, with nephrology to be contacted immediately if the patient is deteriorating, or has seizure activity, which also might involve transferring the patient to the ICU.  -Renal ultrasound unremarkable spot urine protein to creatinine ratio and urinary sodium,and,thyroid function tests to be followed up.fo  -Monitor potasium levels post repletion.  -Gibbs placement for accurate I/O  -Cont Home Klonopin.  -May need referral for reduction mastectomy as outpatient for chronic back pain /  -Psychiatry consult,prior to discharge.  -Follow up on nephrology Recs    Please refer to Interns  H&P for complete admission details.      A computerized dictation system may have been used for this note.    Despite review, there may be some spelling or grammatical errors.

## 2019-04-26 NOTE — PROGRESS NOTES
"0645      PT continuously jumping out of bed to go pee. Reminded pt that she has a dasilva catheter in place and she just needs     0730      PT spent most of her morning crying and begging for her mother to call or come by. I dialed her mother and gave her the phone. She spoke with her mother begging her to come by. Her mother stated she would be by later in the day to visit.     1000    Pt called mother again. No answer. Started crying again. This CNA informed PT that if mother did not come by noon that we would call her again.     1030     PT bit off IV yelling she wanted to leave     1100    PT mother arrived. PT appears to be happy     1330     PT tried jumping OOB. She said she needed to have a BM. Walked her to bathroom. Told her I was going to change her sheets and that when she was done with her BM to pull the red cord and I would help her get back to bed. In the middle of changing sheets PT made an attempt to leave the Bathroom without assistance. This CNA escorted her back into bathroom, at which point mother stated \"I will help keep her in the bathroom so you can finish your job\" This CNA witnessed the PT try to shove her way through her mother stating \"stop Mom. You need to stop telling me how to live my life.\" This CNA helped pt back to bed and reminded her again that the dasilva catheter is there for her urine. Pt verbalized understanding but said that the catheter was painful.     1430     When this CNA returned from lunch PT was verbally abusing her mother saying \"You're the worst mother ever I don't know why you're even here.\" PT then attempted to get OOB with catheter still in tact. HUGO Francisco and this CNA reminded PT of dasilva at which point pt starts screaming at staff yelling \"no one wants to take care of me! Everyone just wants to feel sorry for her!\" points to her mother \"I want to leave you all hate me and won't stop talking shit about me!\"  Her mother tried to tell her that no one was talking " "bad about her. PT yelled \"shut the fuck up mom\" This CNA informed PT that if she continued with the aggressive behavior that we would be forced to call security. Pt replied with \"fine go ahead. I want out and you can't stop me\" as she attempted to again walk out of the room with her dasilva in tact. Security called to help diffuse situation    1530   New IV Placed by HUGO Barragan via ultrasound     1600  PT moved to 837.    1700 Pt got aggressive with staff screaming \"I want to leave!\" as she again attempted to leave the room with her dasilva in tact. When HUGO Brito attempted to stop her she tried to pull out her IV and pull off her gown saying \"I don't care if I am naked let everyone see me!\" Staff again attempted to keep her from leaving when she became physically aggressive. HUGO Song and Charge HUGO Shrestha ordered restraints for PT and contacted .   "

## 2019-04-26 NOTE — ASSESSMENT & PLAN NOTE
-Patient denies any suicidal or homicidal ideation at this time. H/o prev Suicidal attempts.   -However due to lithium toxicity and somewhat altered mental status with severe agitation she is at risk to harm self and others.   -Legal hold initiated by psych.

## 2019-04-26 NOTE — ASSESSMENT & PLAN NOTE
Secondary to lithium toxicity  - CT head without contrast to rule out other intracranial causes : Negative  - Continue to monitor   - Ordered EEG to rule out any seizure activity

## 2019-04-26 NOTE — PROGRESS NOTES
"Pt bit IV and severed tubing with teeth. States she no longer wants to be here and have an IV. Pt reoriented and states \"fine! Ill stay!!\" sitter remains at bedside.   "

## 2019-04-26 NOTE — ED NOTES
Pt transported to the floor, bedside report to terrence ARIAS. Pt transported on cardiac monitor and by nitin.

## 2019-04-26 NOTE — ED NOTES
Pt up to bedside commode with assistance, pt very restless and wanting to leave the ER and removing vital sign equipment, pt reoriented and plan of care explained. Pt resting in NAD at this time.

## 2019-04-26 NOTE — ED NOTES
"Pt up out of bed and pulled out IV, pt reports \"needing to call my mom.\" Pt educated on need to call for assistance. Pt is slightly confused about date and time, A&Ox3.   "

## 2019-04-26 NOTE — ED PROVIDER NOTES
"ED Provider Note    Scribed for Renee Crabtree M.D. by Neli Huddleston. 4/25/2019  8:20 PM    Means of arrival: EMS  History obtained from: Patient, EMS  History limited by: None      CHIEF COMPLAINT  Chief Complaint   Patient presents with   • Abnormal Labs       HPI  Kelly Arroyo is a 38 y.o. female transfer from [facility] who presents to the Emergency Department for evaluation of abnormal labs taken earlier today concerning for lithium toxicity. She denies any fevers, shortness of breath, nausea, vomiting, cough, loss of appetite, abdominal pain, dysuria, hematuria or leg edema. The patient endorses \"intense\" back pain at bedside that she attributes to breast size. She denies any history of back surgeries.     The patient additionally states she has been \"feeling really low.\" However, she reports feeling improved at bedside. She denies any suicidal ideation, but reports suicidal ideation with plan in the past.     Patient states she takes three doses of Advil daily. The patient also takes lithium, Seroquel, and clonazepam, and states she is compliant with her prescribed medications. Patient reports she was compliant with lithium for four months. However, she reports non compliance for the past month. She states she recently restarted lithium on 4/17/2019, and denies taking any extra doses.      Patient reports she has nephrolithiasis. She denies any tobacco, alcohol, or drug use.     At bedside, Nursing report the patient took out her IV and seemed confused. They state she was not oriented to date or time, only name and location.     REVIEW OF SYSTEMS  Pertinent positive include back pain. Pertinent negative include fevers, shortness of breath, nausea, vomiting, cough, loss of appetite, abdominal pain, dysuria, hematuria, leg edema or suicidal ideation. All other systems reviewed and are negative.      PAST MEDICAL HISTORY   has a past medical history of Allergy; Anemia; Anxiety; Arthritis; " "At risk for sleep apnea; Bipolar disorder (HCC); Depression; Head ache; Jaundice; Renal disorder; and Substance abuse (HCC).    SOCIAL HISTORY  Social History     Social History Main Topics   • Smoking status: Former Smoker     Packs/day: 1.00     Years: 24.00     Types: Cigarettes     Quit date: 12/1/2018   • Smokeless tobacco: Never Used   • Alcohol use No      Comment: quit in 2017   • Drug use: No      Comment: heroin hx quit in 2003   • Sexual activity: No       SURGICAL HISTORY   has a past surgical history that includes open reduction and daisha by laparoscopy (3/14/2019).    CURRENT MEDICATIONS  No current facility-administered medications on file prior to encounter.      Current Outpatient Prescriptions on File Prior to Encounter   Medication Sig Dispense Refill   • lithium carbonate 300 MG capsule Take one daily for one week then twice a day 60 Cap 3   • clonazePAM (KLONOPIN) 1 MG Tab Take bid 12 hours apart to last 30 days 60 Tab 2   • ferrous sulfate 325 (65 Fe) MG EC tablet Take 1 Tab by mouth 3 times a day, with meals. 90 Tab 0       ALLERGIES  Allergies   Allergen Reactions   • Bee Swelling       PHYSICAL EXAM   VITAL SIGNS: /85   Pulse 92   Temp 37 °C (98.6 °F) (Temporal)   Resp 18   Ht 1.778 m (5' 10\")   Wt 97 kg (213 lb 13.5 oz)   LMP 04/14/2019   SpO2 99%   BMI 30.68 kg/m²      Constitutional: Alert in no apparent distress.  Disheveled however nontoxic appearing female  HENT: Normocephalic, Atraumatic. Bilateral external ears normal. Nose normal.  Dry mucous membranes.  Oropharynx clear.  Eyes: Pupils are equal and reactive. Conjunctiva normal.   Neck: Supple, full range of motion  Heart: Regular rate and rhythm.  No murmurs.    Lungs: No respiratory distress, normal work of breathing. Lungs clear to auscultation bilaterally.  Abdomen Soft, no distention.  No tenderness to palpation.  Musculoskeletal: Atraumatic. No obvious deformities noted.  No lower extremity edema. No midline " tenderness.   Skin: Warm, Dry.  No erythema, No rash.   Neurologic: Alert and oriented to self and location, mildly confused and agitated. Moving all extremities spontaneously without focal deficits.  Psychiatric: Affect normal, Mood normal, Appears appropriate and not intoxicated.  Patient denies current suicidal or homicidal ideation.      DIAGNOSTIC STUDIES    EKG  Results for orders placed or performed during the hospital encounter of 04/25/19   CBC WITH DIFFERENTIAL   Result Value Ref Range    WBC 8.8 4.8 - 10.8 K/uL    RBC 4.35 4.20 - 5.40 M/uL    Hemoglobin 11.7 (L) 12.0 - 16.0 g/dL    Hematocrit 37.0 37.0 - 47.0 %    MCV 85.1 81.4 - 97.8 fL    MCH 26.9 (L) 27.0 - 33.0 pg    MCHC 31.6 (L) 33.6 - 35.0 g/dL    RDW 50.2 (H) 35.9 - 50.0 fL    Platelet Count 165 164 - 446 K/uL    MPV 11.4 9.0 - 12.9 fL    Neutrophils-Polys 73.50 (H) 44.00 - 72.00 %    Lymphocytes 15.50 (L) 22.00 - 41.00 %    Monocytes 6.40 0.00 - 13.40 %    Eosinophils 4.20 0.00 - 6.90 %    Basophils 0.20 0.00 - 1.80 %    Immature Granulocytes 0.20 0.00 - 0.90 %    Nucleated RBC 0.00 /100 WBC    Neutrophils (Absolute) 6.44 2.00 - 7.15 K/uL    Lymphs (Absolute) 1.36 1.00 - 4.80 K/uL    Monos (Absolute) 0.56 0.00 - 0.85 K/uL    Eos (Absolute) 0.37 0.00 - 0.51 K/uL    Baso (Absolute) 0.02 0.00 - 0.12 K/uL    Immature Granulocytes (abs) 0.02 0.00 - 0.11 K/uL    NRBC (Absolute) 0.00 K/uL   BASIC METABOLIC PANEL   Result Value Ref Range    Sodium 137 135 - 145 mmol/L    Potassium 3.1 (L) 3.6 - 5.5 mmol/L    Chloride 108 96 - 112 mmol/L    Co2 22 20 - 33 mmol/L    Glucose 109 (H) 65 - 99 mg/dL    Bun 23 (H) 8 - 22 mg/dL    Creatinine 2.22 (H) 0.50 - 1.40 mg/dL    Calcium 8.9 8.5 - 10.5 mg/dL    Anion Gap 7.0 0.0 - 11.9   LITHIUM   Result Value Ref Range    Lithium 2.6 (HH) 0.6 - 1.2 mmol/L   ESTIMATED GFR   Result Value Ref Range    GFR If African American 30 (A) >60 mL/min/1.73 m 2    GFR If Non African American 25 (A) >60 mL/min/1.73 m 2          LABS  Personally reviewed by me  Labs Reviewed   CBC WITH DIFFERENTIAL - Abnormal; Notable for the following:        Result Value    Hemoglobin 11.7 (*)     MCH 26.9 (*)     MCHC 31.6 (*)     RDW 50.2 (*)     Neutrophils-Polys 73.50 (*)     Lymphocytes 15.50 (*)     All other components within normal limits   BASIC METABOLIC PANEL - Abnormal; Notable for the following:     Potassium 3.1 (*)     Glucose 109 (*)     Bun 23 (*)     Creatinine 2.22 (*)     All other components within normal limits   LITHIUM - Abnormal; Notable for the following:     Lithium 2.6 (*)     All other components within normal limits   ESTIMATED GFR - Abnormal; Notable for the following:     GFR If  30 (*)     GFR If Non  25 (*)     All other components within normal limits   HCG QUALITATIVE UR   REFRACTOMETER SG       ED COURSE  Vitals:    04/25/19 2030 04/25/19 2100 04/25/19 2130 04/25/19 2200   BP:       Pulse: 88  85 84   Resp: 18 18 18 18   Temp:       TempSrc:       SpO2: 97%  96% 98%   Weight:       Height:             Medications administered:  Medications   NS infusion ( Intravenous New Bag 4/25/19 2115)     Patient was given IV fluids for lithium toxicity and kidney dysfunction.  IV hydration was used because oral hydration was not adequate alone.  Following fluid administration patient's vitals were stable.  Repeat labs pending.      Old records personally reviewed:  Review of past medical records shows the patient was seen at [facility] for mental evaluation. She has a history of bipolar disorder. Labs done revealed initial lithium of 3.4, and then 3.3 after given 2 L fluids. Initial creatinine was 2.7, and 2.5 after fluids. Poison control recommended only IV fluids at this time. Dr. Sousa (Nephrology) recommended total of 4 L of IV fluids, and to monitor eyes and nose closely with Gibbs. Lab results indicate negative ASA and tylenol, negative alcohol, negative Utox, normal  magnesium, potassium of 3.2, and and unremarkable renal ultrasound.    8:20 PM Patient seen and examined at bedside. The patient presents as transfer with lithium toxicity. Ordered for CBC with differential, BMP, lithium, and EKG to evaluate.     MEDICAL DECISION MAKING  Patient with history of bipolar disorder on lithium who presents as a transfer from outside hospital with concern for lithium toxicity.  The patient states she is not been taking extra doses intentionally and currently denies suicidal ideation therefore she was not placed on legal hold.  She is afebrile with reassuring vitals on arrival.  She is mildly confused and agitated however has no other clinical signs of toxicity at this time.  Reviewed EKG from outside hospital without evidence of ischemia or arrhythmia.  Repeat labs were completed following a total of 4 L of hydration.  Lithium level appears to be diminishing along with improving renal function.  Patient does have mild hypokalemia which we will begin to initiate repletion here in the department.  No other significant electrolyte abnormalities.    8:49 PM Reviewed past medical records as outlined above.  Aspirin and Tylenol levels were negative.  Alcohol and drug screens negative.  Patient is not pregnant.    10:35 PM - Called Poison Control, case number is [id number]. No indication for dialysis at this time.  Also discussed the case with Dr. Sousa, nephrologist on-call who agrees that the patient should not need hemodialysis and recommends continuation of aggressive IV hydration.    11:00 PM - Discussed the case with [facility] internal medicine resident on-call who accepts admission of the patient.  The patient is stable at this time for transfer to the floor.      DISPOSITION:  Patient will be admitted to [facility] in guarded condition.      IMPRESSION  1. Lithium toxicity, accidental or unintentional, initial encounter    2. TAMIKO (acute kidney injury) (HCC)        Results, diagnoses, and treatment  options were discussed with the patient and/or family. Patient verbalized understanding of plan of care.    New Prescriptions    No medications on file            I, Neli Huddleston (Juan Francisco), am scribing for, and in the presence of, Renee Crabtree M.D..    Electronically signed by: Neli Huddleston (Juan Francisco), 4/25/2019    I, Renee Crabtree M.D. personally performed the services described in this documentation, as scribed by Neli Huddleston in my presence, and it is both accurate and complete. C.     The note accurately reflects work and decisions made by me.  Renee Crabtree  4/26/2019  1:37 AM

## 2019-04-26 NOTE — ED TRIAGE NOTES
"Chief Complaint   Patient presents with   • Abnormal Labs     BIB EMS as transfer from [facility] for  abnormal kidney labs. Pt reports her doctor increasing her lithium dose recently and pt has elevated levels (3.4). Pt has history of bipolar disorder. Pt was initially brought to [facility] for mental health evaluation, pt reports feeling \"out of sorts lately.\" Pt placed on cardiac monitor, BP cuff and pulse ox.       /85   Pulse 92   Temp 37 °C (98.6 °F) (Temporal)   Resp 18   Ht 1.778 m (5' 10\")   Wt 97 kg (213 lb 13.5 oz)   LMP 04/14/2019   SpO2 99%   BMI 30.68 kg/m²     "

## 2019-04-26 NOTE — PROGRESS NOTES
Med rec complete per pt, pt's family at bedside, pt's RX bottles locked in pharmacy, and a call to [facility] in [facility]  Pt has bottle of lithium in sealed bag in pharmacy, an empty outdated bottle of ibuprofen, and an old bottle of Norco (2014) that appears to be filled with assorted tablets  Called [facility] to verify other current prescriptions  Pt has both Seroquel 400mg tabs (x2 QHS) and Seroquel 200mg (x1 QHS) filled at pharmacy recently  Pt not able to verify what current dosing  Per pt and family at bedside, pt's mother took away her medications a couple of days ago, so she has not had any of her normal medications in at least 2-3 days  Allergies reviewed  No ABX in last month

## 2019-04-26 NOTE — H&P
"      Internal Medicine Admitting History and Physical    Note Author: Maxx Christensen D.O.       Name Kelly Arroyo     1980   Age/Sex 38 y.o. female   MRN [id number]   Code Status Full Code      After 5PM or if no immediate response to page, please call for cross-coverage  Attending/Team: Dr. Jauregui / [employer]  See Patient List for primary contact information  Call [phone] to page    1st Call - Day Intern (R1):   Dr. Hunt 2nd Call - Day Sr. Resident (R2/R3):   Dr. Lester       Chief Complaint:   Sent by outside facility for abnormal labs     HPI:  Ms. Kelly Arroyo is a 38-year old female with past medical history of bipolar disorder, chronic back pain and history of acetaminophen associated liver toxicity requiring CRRT in 2018 who presented to the hospital from an outside facility for abnormal labs. There patient had presented with a wrist laceration and suicidal ideation. Initial labs revealed acute kidney injury with a BUN/Cr of 24/2.7. Patient was then found to have elevated Lithium level at 3.4. Poison control recommended transfer to higher level of care. Patient is a poor historian, as she was only oriented to self at the time of evaluation. Patient reports she cannot remember what happened yesterday, but was about to do something \"really bad\", but didn't. She states she has a twenty year history of chronic low back pain. In the chart, it is documented that patient was taking \"5-10 ibuprofen\" tablets a day for her back pain. However, she reported to me that she hasn't taken any ibuprofen or lithium for the last few months. Patient reports feeling nauseous but denies any dysuria or hematuria.     In the ED, patient was noted to be hemodynamically stable. Patient was found to have a creatinine of 2.22 and a potassium of 3.1. Lithium level on presentation was 2.6.  Ultrasound at the outside hospital was unremarkable. Poison control recommended continuing IV fluid resuscitation. " Nephrology was consulted, Dr. Sousa, who recommended continuing IV fluids and that there was no urgent need for dialysis. Patient received 1L NS in the ED and 10mEq of IV KCl. She was admitted for further medical management.     Review of Systems   Constitutional: Negative for chills, diaphoresis and fever.   HENT: Negative for congestion, ear pain, hearing loss, sore throat and tinnitus.    Eyes: Negative for blurred vision, double vision, pain and redness.   Respiratory: Negative for cough, hemoptysis, sputum production and shortness of breath.    Cardiovascular: Positive for palpitations. Negative for chest pain, orthopnea and leg swelling.   Gastrointestinal: Positive for nausea. Negative for abdominal pain, heartburn and vomiting.   Genitourinary: Negative for dysuria, flank pain, frequency, hematuria and urgency.   Musculoskeletal: Positive for back pain.   Skin: Negative for itching and rash.   Neurological: Positive for tremors, weakness and headaches. Negative for dizziness and tingling.   Endo/Heme/Allergies: Positive for polydipsia.   Psychiatric/Behavioral: Positive for memory loss. Negative for substance abuse and suicidal ideas (Denies homicidal ideation).            Past Medical History (Chronic medical problem, known complications and current treatment)     Bipolar disorder, chronic back pain and history of acetaminophen associated liver toxicity requiring CRRT in 12/2018      Past Surgical History:  Past Surgical History:   Procedure Laterality Date   • FENG BY LAPAROSCOPY  3/14/2019    Procedure: CHOLECYSTECTOMY WITH CHOLANGIOGRAMS LAPAROSCOPIC;  Surgeon: Niels Flannery D.O.;  Location: SURGERY [facility];  Service: General   • OPEN REDUCTION         Current Outpatient Medications:  Home Medications     Reviewed by Betsy Alas (Pharmacy Tech) on 04/25/19 at 2052  Med List Status: Unable to Obtain   Medication Last Dose Status   clonazePAM (KLONOPIN) 1 MG Tab  Active   ferrous  "sulfate 325 (65 Fe) MG EC tablet  Active   lithium carbonate 300 MG capsule  Active                Medication Allergy/Sensitivities:  Allergies   Allergen Reactions   • Bee Swelling         Family History (mandatory)   Family History   Problem Relation Age of Onset   • Arthritis Mother    • Heart Disease Maternal Aunt    Alcoholism/addiction: mother     Social History (mandatory)   Social History     Social History   • Marital status: Single     Spouse name: N/A   • Number of children: N/A   • Years of education: N/A     Occupational History   • Not on file.     Social History Main Topics   • Smoking status: Former Smoker     Packs/day: 1.00     Years: 24.00     Types: Cigarettes     Quit date: 12/1/2018   • Smokeless tobacco: Never Used   • Alcohol use No      Comment: quit in 2017   • Drug use: No      Comment: heroin hx quit in 2003   • Sexual activity: No     Other Topics Concern   • Not on file     Social History Narrative   • No narrative on file     Living situation: Originally from [facility]. Trying to live with her mother at the moment.   Denies tobacco, alcohol and recreational drug use     PCP : LEXIE Franklin    Physical Exam     Vitals:    04/25/19 2130 04/25/19 2200 04/25/19 2300 04/26/19 0000   BP:       Pulse: 85 84 83 78   Resp: 18 18 18 18   Temp:       TempSrc:       SpO2: 96% 98% 98% 97%   Weight:       Height:         Body mass index is 30.68 kg/m².  /85   Pulse 78   Temp 37 °C (98.6 °F) (Temporal)   Resp 18   Ht 1.778 m (5' 10\")   Wt 97 kg (213 lb 13.5 oz)   LMP 04/14/2019   SpO2 97%   BMI 30.68 kg/m²   O2 therapy: Pulse Oximetry: 97 %, O2 Delivery: None (Room Air)    Physical Exam   Constitutional: No distress.   Obese,  female laying in bed in no acute distress. Talking to her mother on the phone when I walked in.    HENT:   Head: Normocephalic and atraumatic.   Mouth/Throat: No oropharyngeal exudate.   Dry mucous membranes, Mallampati III   Eyes: " Pupils are equal, round, and reactive to light. EOM are normal. No scleral icterus.   No nystagmus   Neck: No JVD present. No tracheal deviation present. No thyromegaly present.   Cardiovascular: Normal rate, regular rhythm, normal heart sounds and intact distal pulses.    No murmur heard.  Pulmonary/Chest: Effort normal and breath sounds normal. No respiratory distress. She has no wheezes. She has no rales.   Abdominal: Soft. Bowel sounds are normal. She exhibits no distension. There is no tenderness. There is no rebound and no guarding.   Genitourinary:   Genitourinary Comments: Rectal exam deferred as patient is alert and oriented to self only and cannot provide consent in state of encephalopathy.    Musculoskeletal: Normal range of motion. She exhibits no edema.   Tenderness to lumbosacral paraspinal muscles. Intact range of motion. No overlying erythema or edema.    Neurological: She is alert. She has normal sensation, normal strength and intact cranial nerves. She displays tremor (Head tremor, and hand tremors bilaterally). She displays no weakness and facial symmetry. She exhibits normal muscle tone. She has a normal Finger-Nose-Finger Test. She shows no pronator drift. GCS score is 15.   Alert and oriented to self only. Thought it was 1990 with President Carvajal. Spontaneous limb movements, normal speech   Skin: Skin is warm and dry. Abrasion (Multiple abrasions to the right LE and left UE. ) and ecchymosis (Left wrist) noted. She is not diaphoretic.   Psychiatric: She expresses no homicidal and no suicidal ideation. She expresses no suicidal plans and no homicidal plans. She exhibits disordered thought content and abnormal recent memory. She exhibits normal remote memory.   +tagiential thinking         Data Review       Old Records Request:   Completed  Current Records review/summary: Completed    Lab Data Review:  Recent Results (from the past 24 hour(s))   URINE DRUG SCREEN    Collection Time: 04/25/19 12:33  PM   Result Value Ref Range    Phencyclidine -Pcp Negative Negative    Benzodiazepines Negative Negative    Cocaine Metabolite Negative Negative    Amphetamines By Triage Negative Negative    Urine THC Negative Negative    Opiates Negative Negative    Barbiturates Negative Negative    MDMA (Ecstasy) Negative Negative    Methadone Negative Negative    Oxycodone Negative Negative    Urine Amphetamine-Methamphetam Negative Negative    Tricyclic Antidepressants Negative Negative   URINE SODIUM RANDOM    Collection Time: 04/25/19 12:33 PM   Result Value Ref Range    Sodium, Urine -per volume 6 mmol/L   PROTEIN/CREAT RATIO URINE    Collection Time: 04/25/19 12:33 PM   Result Value Ref Range    Total Protein, Urine <6.0 0.0 - 15.0 mg/dL    Creatinine, Random Urine 33.45 mg/dL    Protein Creatinine Ratio see below 0.0 - 0.2 Ratio   CBC WITH DIFFERENTIAL    Collection Time: 04/25/19 12:40 PM   Result Value Ref Range    WBC 10.4 4.8 - 10.8 K/uL    RBC 4.46 4.20 - 5.40 M/uL    Hemoglobin 11.9 (L) 13.0 - 17.0 g/dL    Hematocrit 37.0 (L) 39.0 - 50.0 %    MCV 83.0 81.0 - 99.0 fL    MCH 26.7 (L) 28.4 - 32.7 pg    MCHC 32.2 (L) 33.0 - 37.0 g/dL    RDW 15.9 (H) 11.5 - 14.5 %    Platelet Count 180 130 - 400 K/uL    MPV 11.0 (H) 7.4 - 10.4 fL    Neutrophils Automated 78.3 (H) 39.0 - 70.0 %    Lymphocytes Automated 11.3 (L) 21.0 - 50.0 %    Monocytes Automated 6.1 1.7 - 10.0 %    Eosinophils Automated 4.0 0.0 - 5.0 %    Basophils Automated 0.3 0.0 - 3.0 %    Abs Neutrophils Automated 8.1 (H) 1.8 - 7.7 K/uL    Abs Lymph Automated 1.2 1.2 - 4.8 K/uL    Monos (Absolute) 0.6 0.2 - 0.9 K/uL   COMP METABOLIC PANEL    Collection Time: 04/25/19 12:40 PM   Result Value Ref Range    Sodium 135 (L) 136 - 145 mmol/L    Potassium 3.4 (L) 3.5 - 5.1 mmol/L    Chloride 102 98 - 107 mmol/L    Co2 21 20 - 29 mmol/L    Anion Gap 12 4 - 12 mmol/L    Glucose 103 (H) 70 - 100 mg/dL    Bun 24 (H) 6 - 18 mg/dL    Creatinine 2.7 (H) 0.6 - 1.0 mg/dL    Calcium  10.0 8.5 - 10.1 mg/dL    AST(SGOT) 30 10 - 37 U/L    ALT(SGPT) 29 12 - 78 U/L    Alkaline Phosphatase 138 (H) 46 - 116 U/L    Total Bilirubin 0.4 0.1 - 1.2 mg/dL    Albumin 4.2 3.5 - 5.0 g/dL    Total Protein 7.7 6.4 - 8.3 g/dL    A-G Ratio 1.2    ACETAMINOPHEN    Collection Time: 04/25/19 12:40 PM   Result Value Ref Range    Acetaminophen -Tylenol <2.0 ug/mL   DIAGNOSTIC ALCOHOL    Collection Time: 04/25/19 12:40 PM   Result Value Ref Range    Ethyl Alcohol -Ethanol  Etoh <0.010 0.000 - 0.010 gm/dl   ESTIMATED GFR    Collection Time: 04/25/19 12:40 PM   Result Value Ref Range    GFR If  24 (A) >60 mL/min/1.73 m 2    GFR If Non African American 20 (A) >60 mL/min/1.73 m 2   LITHIUM    Collection Time: 04/25/19 12:40 PM   Result Value Ref Range    Lithium 3.4 (HH) 0.6 - 1.2 mmol/L   URINALYSIS (UA)    Collection Time: 04/25/19  1:07 PM   Result Value Ref Range    Color YELLOW     Character CLEAR     Specific Gravity <=1.005 (A) <1.035    Ph 6.0 5.0 - 8.0    Glucose NEGATIVE Negative mg/dL    Ketones NEGATIVE Negative mg/dL    Protein NEGATIVE Negative mg/dL    Bilirubin NEGATIVE Negative    Urobilinogen, Urine 0.2 Negative    Nitrite NEGATIVE Negative    Leukocyte Esterase TRACE (A) Negative    Occult Blood NEGATIVE Negative   HCG QUALITATIVE UR (Lab)    Collection Time: 04/25/19  1:07 PM   Result Value Ref Range    Beta-Hcg Urine Negative Negative   URINE MICROSCOPIC (W/UA)    Collection Time: 04/25/19  1:07 PM   Result Value Ref Range    WBC 0-2 0 - 6 /hpf    RBC None Seen 0 - 3 /hpf    Bacteria >100 (A) None /hpf    Epithelial Cells 6-10 (A) /hpf    Urine Other See Below    MRSA SURVEILLANCE    Collection Time: 04/25/19  1:18 PM   Result Value Ref Range    Mrsa Screen Negative    Basic Metabolic Panel    Collection Time: 04/25/19  3:35 PM   Result Value Ref Range    Sodium 132 (L) 136 - 145 mmol/L    Potassium 3.3 (L) 3.5 - 5.1 mmol/L    Chloride 104 98 - 107 mmol/L    Co2 21 20 - 29 mmol/L     Glucose 93 70 - 100 mg/dL    Bun 26 (H) 6 - 18 mg/dL    Creatinine 2.7 (H) 0.6 - 1.0 mg/dL    Calcium 9.4 8.5 - 10.1 mg/dL    Anion Gap 7 4 - 12 mmol/L   ESTIMATED GFR    Collection Time: 04/25/19  3:35 PM   Result Value Ref Range    GFR If  24 (A) >60 mL/min/1.73 m 2    GFR If Non African American 20 (A) >60 mL/min/1.73 m 2   MAGNESIUM    Collection Time: 04/25/19  3:35 PM   Result Value Ref Range    Magnesium 2.0 1.8 - 2.4 mg/dL   LITHIUM    Collection Time: 04/25/19  5:05 PM   Result Value Ref Range    Lithium 3.3 (HH) 0.6 - 1.2 mmol/L   Basic Metabolic Panel    Collection Time: 04/25/19  6:45 PM   Result Value Ref Range    Sodium 137 136 - 145 mmol/L    Potassium 3.2 (L) 3.5 - 5.1 mmol/L    Chloride 106 98 - 107 mmol/L    Co2 19 (L) 20 - 29 mmol/L    Glucose 88 70 - 100 mg/dL    Bun 24 (H) 6 - 18 mg/dL    Creatinine 2.5 (H) 0.6 - 1.0 mg/dL    Calcium 8.8 8.5 - 10.1 mg/dL    Anion Gap 12 4 - 12 mmol/L   ESTIMATED GFR    Collection Time: 04/25/19  6:45 PM   Result Value Ref Range    GFR If  26 (A) >60 mL/min/1.73 m 2    GFR If Non African American 21 (A) >60 mL/min/1.73 m 2   CBC WITH DIFFERENTIAL    Collection Time: 04/25/19  8:40 PM   Result Value Ref Range    WBC 8.8 4.8 - 10.8 K/uL    RBC 4.35 4.20 - 5.40 M/uL    Hemoglobin 11.7 (L) 12.0 - 16.0 g/dL    Hematocrit 37.0 37.0 - 47.0 %    MCV 85.1 81.4 - 97.8 fL    MCH 26.9 (L) 27.0 - 33.0 pg    MCHC 31.6 (L) 33.6 - 35.0 g/dL    RDW 50.2 (H) 35.9 - 50.0 fL    Platelet Count 165 164 - 446 K/uL    MPV 11.4 9.0 - 12.9 fL    Neutrophils-Polys 73.50 (H) 44.00 - 72.00 %    Lymphocytes 15.50 (L) 22.00 - 41.00 %    Monocytes 6.40 0.00 - 13.40 %    Eosinophils 4.20 0.00 - 6.90 %    Basophils 0.20 0.00 - 1.80 %    Immature Granulocytes 0.20 0.00 - 0.90 %    Nucleated RBC 0.00 /100 WBC    Neutrophils (Absolute) 6.44 2.00 - 7.15 K/uL    Lymphs (Absolute) 1.36 1.00 - 4.80 K/uL    Monos (Absolute) 0.56 0.00 - 0.85 K/uL    Eos (Absolute) 0.37 0.00  - 0.51 K/uL    Baso (Absolute) 0.02 0.00 - 0.12 K/uL    Immature Granulocytes (abs) 0.02 0.00 - 0.11 K/uL    NRBC (Absolute) 0.00 K/uL   BASIC METABOLIC PANEL    Collection Time: 04/25/19  8:40 PM   Result Value Ref Range    Sodium 137 135 - 145 mmol/L    Potassium 3.1 (L) 3.6 - 5.5 mmol/L    Chloride 108 96 - 112 mmol/L    Co2 22 20 - 33 mmol/L    Glucose 109 (H) 65 - 99 mg/dL    Bun 23 (H) 8 - 22 mg/dL    Creatinine 2.22 (H) 0.50 - 1.40 mg/dL    Calcium 8.9 8.5 - 10.5 mg/dL    Anion Gap 7.0 0.0 - 11.9   LITHIUM    Collection Time: 04/25/19  8:40 PM   Result Value Ref Range    Lithium 2.6 (HH) 0.6 - 1.2 mmol/L   ESTIMATED GFR    Collection Time: 04/25/19  8:40 PM   Result Value Ref Range    GFR If African American 30 (A) >60 mL/min/1.73 m 2    GFR If Non African American 25 (A) >60 mL/min/1.73 m 2   FREE THYROXINE    Collection Time: 04/25/19  8:40 PM   Result Value Ref Range    Free T-4 0.58 0.53 - 1.43 ng/dL   TSH    Collection Time: 04/25/19  8:40 PM   Result Value Ref Range    TSH 2.050 0.380 - 5.330 uIU/mL       Imaging/Procedures Review:    Independant Imaging Review: Completed  US-FOREIGN FILM ULTRASOUND   Final Result      DX-CHEST-PORTABLE (1 VIEW)    (Results Pending)   CT-HEAD W/O    (Results Pending)        EKG:   Hard copy not on file, per chart review, normal sinus rhythm at outside facility   Records reviewed and summarized in current documentation :  Yes  [employer] teaching service handout given to patient:  Yes         Assessment/Plan     * Lithium toxicity- (present on admission)   Assessment & Plan    Patient is a 38-year old female with history of bipolar disorder who presents to the hospital with lithium toxicity and acute renal failure. Toxicity may be from excessive lithium ingestion in combination with continued NSAID use. On exam patient is tremulous but does not exhibit dysarthria or other extrapyramidal symptoms. Poison control recommended to monitor Lithium levels and renal function and  continue IV Fluids. Nephrology was consulted and deemed no urgent need for dialysis. Patient is at risk for developing seizures, CNS depression, dysarthria, nephrogenic diabetes insipidus QTc prolongation and extrapyramidal symptoms.   Plan:   - Admit to Telemetry with continuous pulse ox   - Fall/Aspiration/Seizure precautions   - Obtain EKG   - IVF NS 250mL/hr for the first two liters and then 125 mL/hr thereafter as per Uptodate   - Trend Lithium q12h and BMP q8h    - Gibbs for accurate Is/Os   - Check TSH and Free T4   - Nephrology on board      Hypokalemia   Assessment & Plan    Potassium 3.1 on admission. 10mEq IV in ED.   - Potassium 40 mEq x 2 doses   - IVF with 20mEq K   - Trend BMP q8h  - EKG     Acute metabolic encephalopathy   Assessment & Plan    Secondary to lithium toxicity  - Obtain CT head without contrast to rule out other intracranial causes   - Continue to monitor      Acute kidney injury (HCC)- (present on admission)   Assessment & Plan    Likely secondary to Lithium toxicity in the setting of NSAID use. Probably has intrinsic kidney injury. FeNa is 0.4%. Renal ultrasound showed possible nephrolithiasis but patient does not have any corresponding symptoms. Cr 2.7 at outside facility. Baseline is ~0.7.  - No urgent need for dialysis as per nephrology   - Gibbs for accurate Is/Os, at risk for developing nephrogenic DI  - Repeat UA for microscopy   - continue to monitor kidney function and electrolytes      Angiomyolipoma of right kidney   Assessment & Plan    Seen on ultrasound.  - outpatient follow up      History of suicidal tendencies- (present on admission)   Assessment & Plan    Patient denies any suicidal or homicidal ideation at this time.   - Sitter at bedside for history of high risk behavior      History of iron deficiency anemia   Assessment & Plan    Patient with Hb at 11.7 on admission. At baseline.   - Anemia workup with iron panel, ferritin, occult blood, vitamin b12, folate, TSH  and reticulocyte count     Chronic back pain- (present on admission)   Assessment & Plan    Patient reports that it is from her large breasts.   - Tylenol prn      Bipolar disorder (HCC)- (present on admission)   Assessment & Plan    Not currently manic or depressed.   - Holding Lithium   - Continue home Klonopin   - Consult psychiatry for medication recommendations in setting of lithium toxicity and high risk suicidal behavior          Anticipated Hospital stay: Observation admit        Quality Measures  Quality-Core Measures   Reviewed items::  Labs reviewed, Medications reviewed and Radiology images reviewed  Gibbs catheter::  No Gibbs  DVT prophylaxis pharmacological::  Heparin    PCP: MOSHE [email or web address]

## 2019-04-26 NOTE — ASSESSMENT & PLAN NOTE
Patient reports that it is from her large breasts.   - Tylenol prn, Lidocaine patch. Will avoid narcotics in setting of her severe opoid use disorder unless alternatives are inadequate.   - Scheduled Celebrex 100 mg BID osteoarthritis dose.

## 2019-04-26 NOTE — ASSESSMENT & PLAN NOTE
- On Lithium as outpatient.   - Holding Lithium   - Continue home Klonopin and Seroquel.    - Psychiatry on board, appreciate help.

## 2019-04-26 NOTE — PROGRESS NOTES
Dr pires updated on pts agitation and lack of IV while awaiting iv placement. Updated on reccomendations from poison control for q6-8  Hour bnp and lithium and fluids to run around 200-250cchr.  md to review chart and order something for patients agitation (no iv at this time)

## 2019-04-26 NOTE — ED NOTES
Pt out of bed again, and removed vital sign equipment. Pt back in bed and lap belt in use with sitter in place.

## 2019-04-26 NOTE — ASSESSMENT & PLAN NOTE
Patient with Hb at 11.7 on admission. At baseline.   - Low B12, IM dose given today, gastrin level normal.  - Low Ferritin level, Celiac panel normal. Stated supplementation.   - FOBT pending.

## 2019-04-26 NOTE — PROGRESS NOTES
Internal Medicine Interval Note  Note Author: Margret Hunt M.D.     Name Kelly Arroyo     1980   Age/Sex 38 y.o. female   MRN [id number]   Code Status Full     After 5PM or if no immediate response to page, please call for cross-coverage  Attending/Team: Dr. Jauregui/ANJEL Cruz See Patient List for primary contact information  Call [phone] to page    1st Call - Day Intern (R1):   Dr. Hunt 2nd Call - Day Sr. Resident (R2/R3):   Dr. Lester         Reason for interval visit  (Principal Problem)   Lithium toxicity      Interval Problem Daily Status Update  (24 hours, problem oriented, brief subjective history, new lab/imaging data pertinent to that problem)   Admitted overnight, Alert, able to having full conversation, but not fully oriented. Per patient's mother, this is not her baseline.  Once Lithium level is within range, will perform a formal cognitive eval.   Sitter at bedside for high risk of suicide.   Lithium level trending down, Creatinine level trending down. Trend Q8H  Nephrology signed off as she is responding to IVF, no urgent HD needed. I/O in last shift -625  Cont IVF @200 ml /hour  Low B12, Ferritin. Started supplementation. Gastrin level, celiac panel, FOBT pending.     Update: Got paged at around 3.30 PM, patient is becoming agitated, yelling, pulled out IV and wanting to leave. IV 2 mg Ativan was given to calm her down, with 400 mg of Seroquel. Case discussed with On call psychiatrist Dr. Mohan. This AMS is from Lithium toxicity, will continue IVF. Her team will f/u tomorrow, recommended continuing Seroquel at current dose (400 mg daily) and Haldol 5 mg IM PRN Q6H for agitation. Her QTc is 466    As patient is altered and can't self care at this point, CAN'T LEAVE AMA    Review of Systems   Reason unable to perform ROS: Limited due to patient condition.   Constitutional: Negative for chills and fever.   Eyes: Negative for blurred vision and double vision.    Respiratory: Negative for cough and shortness of breath.    Cardiovascular: Negative for chest pain and leg swelling.   Gastrointestinal: Negative for abdominal pain, nausea and vomiting.   Genitourinary: Positive for frequency. Negative for dysuria, flank pain, hematuria and urgency.   Musculoskeletal: Positive for back pain (Chronic). Negative for falls, joint pain, myalgias and neck pain.   Neurological: Negative for dizziness, tingling, tremors, focal weakness and headaches.        Chronic dyskinetic movements of whole body    Psychiatric/Behavioral: Positive for hallucinations (was hearing mother when she was not here). Negative for depression and suicidal ideas. The patient is not nervous/anxious.        Disposition/Barriers to discharge:   Inpatient for Lithium toxicity and TAMIKO    Consultants/Specialty  Nephrology  PCP: MOSHE FranklinNDAVION      Quality Measures  Quality-Core Measures   Reviewed items::  EKG reviewed, Labs reviewed, Medications reviewed and Radiology images reviewed  Gibbs catheter::  No Gibbs  DVT prophylaxis pharmacological::  Heparin          Physical Exam       Vitals:    04/26/19 0315 04/26/19 0407 04/26/19 0905 04/26/19 1230   BP:  118/75 108/72 118/72   Pulse: 91 85 87 79   Resp: 18 18 16 16   Temp:  36.8 °C (98.3 °F) 36.6 °C (97.9 °F) 36.6 °C (97.9 °F)   TempSrc:  Temporal Temporal Temporal   SpO2: 96% 96% 98% 99%   Weight:       Height:         Body mass index is 31.73 kg/m². Weight: 100.3 kg (221 lb 1.9 oz)  Oxygen Therapy:  Pulse Oximetry: 99 %, O2 (LPM): 0, O2 Delivery: None (Room Air)    Physical Exam   Constitutional:   No acute distress, able to participate in conversation, however appears confused. Generalized tremulous movements of whole body which is chronic    HENT:   Head: Normocephalic and atraumatic.   Eyes: Pupils are equal, round, and reactive to light. Conjunctivae and EOM are normal. Right eye exhibits no discharge. Left eye exhibits no discharge. Right eye  exhibits no nystagmus. Left eye exhibits no nystagmus.   Neck: Normal range of motion. Neck supple.   Cardiovascular: Normal rate, regular rhythm, normal heart sounds and intact distal pulses.    Pulmonary/Chest: Effort normal and breath sounds normal. No respiratory distress. She has no wheezes.   Abdominal: Soft. Bowel sounds are normal. She exhibits distension (No ascitis). There is no tenderness. There is no guarding.   Musculoskeletal: Normal range of motion. She exhibits no edema or tenderness.   Neurological: She is alert. She has normal sensation, normal strength and normal reflexes. She displays normal reflexes. No cranial nerve deficit. She exhibits abnormal muscle tone (Slightly increased tone). She has a normal Cerebellar Exam and a normal Romberg Test. Gait normal.   Patient has impaired cognition, unsure if that's her baseline or this is from lithium toxicity. Mocha couldn't be done properly. Will repeat once Lithium level is within therapeutic range.    Skin: Skin is dry. No erythema.   Psychiatric: Affect and judgment normal.   Strongly denies any SI/HI             Assessment/Plan     * Lithium toxicity- (present on admission)   Assessment & Plan    Patient is a 38-year old female with history of bipolar disorder who presents to the hospital with lithium toxicity and acute renal failure. Toxicity may be from excessive lithium ingestion in combination with continued NSAID use. On exam patient is tremulous but does not exhibit dysarthria or other extrapyramidal symptoms. Nephrology was consulted and deemed no urgent need for dialysis. TSH WNL, On admission QTc 466  - Continue Tele, monitor Qtc.   - Fall/Aspiration/Seizure precautions   - IVF  ml/ hour  - Trend Lithium and BMP Q8H   - Gibbs for accurate Is/Os   - Consulted psychiatry today, appreciate help. Recommended continuing Seroquel at current dose (400 mg daily) and Haldol 5 mg IM PRN Q6H for agitation. They will f/u tomorrow.       Hypokalemia   Assessment & Plan    Potassium 3.1 on admission. Trending up, replete as needed  Continue telemetry     Acute metabolic encephalopathy   Assessment & Plan    Secondary to lithium toxicity  - CT head without contrast to rule out other intracranial causes : Negative  - Continue to monitor      Acute kidney injury (HCC)- (present on admission)   Assessment & Plan    Likely secondary to Lithium toxicity in the setting of NSAID use. FeNa is 0.4%. Renal ultrasound showed possible nephrolithiasis but patient does not have any corresponding symptoms. Cr 2.7 at outside facility. Baseline is ~0.7.  - No urgent need for dialysis as per nephrology   - Kidney function trending down   - Gibbs for accurate Is/Os, at risk for developing nephrogenic DI  - Continue to monitor kidney function and electrolytes      Angiomyolipoma of right kidney   Assessment & Plan    Seen on ultrasound.  - outpatient follow up      History of suicidal tendencies- (present on admission)   Assessment & Plan    Patient denies any suicidal or homicidal ideation at this time.   - Sitter at bedside for history of high risk behavior      History of iron deficiency anemia   Assessment & Plan    Patient with Hb at 11.7 on admission. At baseline.   - Low B12, IM dose given today, gastrin level pending  - Low Ferritin level, ordered celiac panel. Stated supplementation.   - FOBT pending.      Chronic back pain- (present on admission)   Assessment & Plan    Patient reports that it is from her large breasts.   - Tylenol prn, Lidocaine patch.      Bipolar disorder (HCC)- (present on admission)   Assessment & Plan    - On [place] as outpatient.   - Holding Lithium   - Continue home Klonopin and Seroquel.    - Consulted psychiatry today for increased agitation during the day. Appreciate help.

## 2019-04-26 NOTE — PROGRESS NOTES
Pt is extremely confused ox1 jumping out of bed and is attempting to run out of the room pulling on dasilva catheter knocked iv pole over. Pt is hallucinating and screaming.  And is seeing spiders. Family at bedside. Pt had to be restrained 3 point with soft restraints to bilateral wrists and left leg. unr paged at this time.

## 2019-04-26 NOTE — NON-PROVIDER
"       Internal Medicine Medical Student Note  Note Author: Obie Nitza, Student    Name Kelly Arroyo     1980   Age/Sex 38 y.o. female   MRN [id number]   Code Status Full       Reason for interval visit  (Principal Problem)   Lithium toxicity    Interval Problem Daily Status Update  (problem status, last 24 hours, new history, new data )     1. Lithium toxicity  -Cr down to 1.54 from 2.22 yesterday  -Li down to 2.4 from 2.6  -K up to 3.7  -No recent nausea, vomiting, diarrhea  -Neurologic exam:   -CN II-XII fully intact   -Strength and sensation to light touch intact throughout   -Moderate increase to tone in both upper and lower extremities   -Deep tendon reflexes 2+ throughout   -First toe clonus bilaterally (right more than left), No ankle clonus   -Babinski WNL, Hubbard WNL   -Marked difficulty with finger to nose bilaterally, also observed significant  worsening of her persistent tremor with intention   -Pt unable to perform rapid alternating movements bilaterally (though seemed  to not  be able to comprehend instructions)   -Steady when walking, fluid movements, normal arm swing. Turned with out  difficulties or apparent tendency to fall   -Romberg unremarkable  -Altered mental status   -Patient is oriented to self, but not to place or time (patient states she believes  she is in a mental hospital after being committed by her mother)   -Patient stated her mood was \"pissed off\" and her affect was observed to be  agitated and confrontational   -Patient's thoughts are disorganized and she had significant difficulty with  attention   -[place] Cognitive Assessment was 3/30   -No pressured speech, flight of ideas, or expression of grandiosity   -Pt denies SI/HI   -Mom in the room states this is very abnormal--not her daughter's baseline   -Mom suspects she is \"lashing out\" because \"her body is falling apart and she  is always in pain\"      Physical Exam       Vitals:    19 0315 19 0407 " 04/26/19 0905 04/26/19 1230   BP:  118/75 108/72 118/72   Pulse: 91 85 87 79   Resp: 18 18 16 16   Temp:  36.8 °C (98.3 °F) 36.6 °C (97.9 °F) 36.6 °C (97.9 °F)   TempSrc:  Temporal Temporal Temporal   SpO2: 96% 96% 98% 99%   Weight:       Height:         Body mass index is 31.73 kg/m². Weight: 100.3 kg (221 lb 1.9 oz)  Oxygen Therapy:  Pulse Oximetry: 99 %, O2 (LPM): 0, O2 Delivery: None (Room Air)    Physical Exam    GEN: NAD  HEENT: normocephalic, PERRLA, EOMI  CARDIO: RRR, no murmurs, rubs or gallops  PULMONARY: CTAB, no adventitious breath sounds  GI: Abdomen soft nontender, no organomegaly, normoactive bowel sounds  EXTREMITIES: WWP, atraumatic  SKIN: Intact without rashes or erythema  NEURO: As above  COGN: As above  PSYCH: As above  ORIENTATION: As above    Assessment/Plan     1. Lithium toxicity, altered mental status (delirium vs BP)  -Head CT WNL  -Lithium levels continue to decrease  -Electrolyte perturbations also continue to improve  -Neurological exam with multiple abnormalities outlined above consistent with Li tox  -Altered mental status consistent with delirium (significant disorientation, agitation, and disorganized thought)--likely to be due to Li tox, rather than diagnosed Bipolar disorder (pt does not appear to be manic currently--no pressure speech, no grandiosity, no flight of ideas)    Plan:  -Continue to address toxicity with rehydration  -Nephro not recommending HD  -Consider psych consultation to evaluate for delirium vs primary psychiatric condition  -Appreciate guidance from psych on management of agitation in the context of delirium and lithium toxicity  -Consider formal cognitive assessment by ST once lithium levels are therapeutic and if mental status returns to baseline    2. Bipolar disorder  -Pt denies SI/HI  -Pt is not exhibiting manic symptoms    Plan:  -Consult psychiatry for inpatient evaluation  -Appreciate guidance on appropriate medication changes if indicated    3.  Normocytic Anemia, hypo-proliferative  -Baseline Hgb appears to be between 10-12 g/dL (documented back to August 2018)  -Decrease in Hgb from baseline observed today (9.8) likely to be dilutional 2/2 aggressive IVF administration  -Lab eval   -B12 mildly low   -Reticulocyte count 1.4%    -Reticulocyte index = 0.68 = hypo-proliferative (based on normal Hct of 42)   -Thyroid studies WNL   -Folate WNL   -Ferritin/Iron studies WNL   -Gastrin pending   -Celiac panel pending   -FOBT marked as needs to be collected    Plan:  -Supplement B12  -Await results of pending labs  -Consider renal etiology if renal function does not return to baseline

## 2019-04-27 LAB
ANION GAP SERPL CALC-SCNC: 0 MMOL/L (ref 0–11.9)
ANION GAP SERPL CALC-SCNC: 1 MMOL/L (ref 0–11.9)
ANION GAP SERPL CALC-SCNC: 3 MMOL/L (ref 0–11.9)
BUN SERPL-MCNC: 12 MG/DL (ref 8–22)
BUN SERPL-MCNC: 14 MG/DL (ref 8–22)
BUN SERPL-MCNC: 9 MG/DL (ref 8–22)
CALCIUM SERPL-MCNC: 9.1 MG/DL (ref 8.5–10.5)
CALCIUM SERPL-MCNC: 9.1 MG/DL (ref 8.5–10.5)
CALCIUM SERPL-MCNC: 9.2 MG/DL (ref 8.5–10.5)
CHLORIDE SERPL-SCNC: 115 MMOL/L (ref 96–112)
CHLORIDE SERPL-SCNC: 119 MMOL/L (ref 96–112)
CHLORIDE SERPL-SCNC: 122 MMOL/L (ref 96–112)
CO2 SERPL-SCNC: 17 MMOL/L (ref 20–33)
CO2 SERPL-SCNC: 17 MMOL/L (ref 20–33)
CO2 SERPL-SCNC: 18 MMOL/L (ref 20–33)
CREAT SERPL-MCNC: 0.87 MG/DL (ref 0.5–1.4)
CREAT SERPL-MCNC: 1.1 MG/DL (ref 0.5–1.4)
CREAT SERPL-MCNC: 1.24 MG/DL (ref 0.5–1.4)
EKG IMPRESSION: NORMAL
GLUCOSE SERPL-MCNC: 110 MG/DL (ref 65–99)
GLUCOSE SERPL-MCNC: 83 MG/DL (ref 65–99)
GLUCOSE SERPL-MCNC: 94 MG/DL (ref 65–99)
IGA SERPL-MCNC: 112 MG/DL (ref 68–408)
LITHIUM SERPL-MCNC: 1.6 MMOL/L (ref 0.6–1.2)
LITHIUM SERPL-MCNC: 2.1 MMOL/L (ref 0.6–1.2)
LITHIUM SERPL-MCNC: 2.1 MMOL/L (ref 0.6–1.2)
POTASSIUM SERPL-SCNC: 3.2 MMOL/L (ref 3.6–5.5)
POTASSIUM SERPL-SCNC: 4.2 MMOL/L (ref 3.6–5.5)
POTASSIUM SERPL-SCNC: 4.7 MMOL/L (ref 3.6–5.5)
SODIUM SERPL-SCNC: 133 MMOL/L (ref 135–145)
SODIUM SERPL-SCNC: 137 MMOL/L (ref 135–145)
SODIUM SERPL-SCNC: 142 MMOL/L (ref 135–145)

## 2019-04-27 PROCEDURE — A9270 NON-COVERED ITEM OR SERVICE: HCPCS | Performed by: STUDENT IN AN ORGANIZED HEALTH CARE EDUCATION/TRAINING PROGRAM

## 2019-04-27 PROCEDURE — 700105 HCHG RX REV CODE 258: Performed by: STUDENT IN AN ORGANIZED HEALTH CARE EDUCATION/TRAINING PROGRAM

## 2019-04-27 PROCEDURE — 80178 ASSAY OF LITHIUM: CPT | Mod: 91

## 2019-04-27 PROCEDURE — 770020 HCHG ROOM/CARE - TELE (206)

## 2019-04-27 PROCEDURE — 36415 COLL VENOUS BLD VENIPUNCTURE: CPT

## 2019-04-27 PROCEDURE — 700102 HCHG RX REV CODE 250 W/ 637 OVERRIDE(OP): Performed by: STUDENT IN AN ORGANIZED HEALTH CARE EDUCATION/TRAINING PROGRAM

## 2019-04-27 PROCEDURE — 80048 BASIC METABOLIC PNL TOTAL CA: CPT | Mod: 91

## 2019-04-27 PROCEDURE — 700111 HCHG RX REV CODE 636 W/ 250 OVERRIDE (IP): Performed by: STUDENT IN AN ORGANIZED HEALTH CARE EDUCATION/TRAINING PROGRAM

## 2019-04-27 PROCEDURE — 99233 SBSQ HOSP IP/OBS HIGH 50: CPT | Mod: GC | Performed by: INTERNAL MEDICINE

## 2019-04-27 PROCEDURE — 93005 ELECTROCARDIOGRAM TRACING: CPT | Performed by: STUDENT IN AN ORGANIZED HEALTH CARE EDUCATION/TRAINING PROGRAM

## 2019-04-27 PROCEDURE — 93010 ELECTROCARDIOGRAM REPORT: CPT | Performed by: INTERNAL MEDICINE

## 2019-04-27 PROCEDURE — 700101 HCHG RX REV CODE 250: Performed by: STUDENT IN AN ORGANIZED HEALTH CARE EDUCATION/TRAINING PROGRAM

## 2019-04-27 RX ORDER — OXYCODONE HCL 10 MG/1
10 TABLET, FILM COATED, EXTENDED RELEASE ORAL ONCE
Status: COMPLETED | OUTPATIENT
Start: 2019-04-27 | End: 2019-04-27

## 2019-04-27 RX ORDER — CLONAZEPAM 1 MG/1
1 TABLET ORAL DAILY
Status: DISCONTINUED | OUTPATIENT
Start: 2019-04-28 | End: 2019-04-29 | Stop reason: HOSPADM

## 2019-04-27 RX ORDER — SODIUM CHLORIDE 9 MG/ML
INJECTION, SOLUTION INTRAVENOUS CONTINUOUS
Status: DISCONTINUED | OUTPATIENT
Start: 2019-04-27 | End: 2019-04-28

## 2019-04-27 RX ORDER — ACETAMINOPHEN 325 MG/1
650 TABLET ORAL EVERY 6 HOURS PRN
Status: DISCONTINUED | OUTPATIENT
Start: 2019-04-27 | End: 2019-04-29 | Stop reason: HOSPADM

## 2019-04-27 RX ADMIN — HEPARIN SODIUM 5000 UNITS: 5000 INJECTION, SOLUTION INTRAVENOUS; SUBCUTANEOUS at 21:42

## 2019-04-27 RX ADMIN — CLONAZEPAM 1 MG: 1 TABLET ORAL at 08:00

## 2019-04-27 RX ADMIN — HEPARIN SODIUM 5000 UNITS: 5000 INJECTION, SOLUTION INTRAVENOUS; SUBCUTANEOUS at 14:47

## 2019-04-27 RX ADMIN — ACETAMINOPHEN 650 MG: 325 TABLET, FILM COATED ORAL at 21:41

## 2019-04-27 RX ADMIN — ACETAMINOPHEN 650 MG: 325 TABLET, FILM COATED ORAL at 00:17

## 2019-04-27 RX ADMIN — LIDOCAINE 1 PATCH: 50 PATCH TOPICAL at 10:58

## 2019-04-27 RX ADMIN — HEPARIN SODIUM 5000 UNITS: 5000 INJECTION, SOLUTION INTRAVENOUS; SUBCUTANEOUS at 05:53

## 2019-04-27 RX ADMIN — SODIUM CHLORIDE: 9 INJECTION, SOLUTION INTRAVENOUS at 07:52

## 2019-04-27 RX ADMIN — QUETIAPINE FUMARATE 400 MG: 200 TABLET ORAL at 21:42

## 2019-04-27 RX ADMIN — FERROUS SULFATE TAB 325 MG (65 MG ELEMENTAL FE) 325 MG: 325 (65 FE) TAB at 08:00

## 2019-04-27 RX ADMIN — SODIUM CHLORIDE: 9 INJECTION, SOLUTION INTRAVENOUS at 15:30

## 2019-04-27 RX ADMIN — OXYCODONE HYDROCHLORIDE 10 MG: 10 TABLET, FILM COATED, EXTENDED RELEASE ORAL at 00:16

## 2019-04-27 RX ADMIN — SODIUM CHLORIDE: 9 INJECTION, SOLUTION INTRAVENOUS at 23:09

## 2019-04-27 ASSESSMENT — ENCOUNTER SYMPTOMS
FALLS: 0
BLURRED VISION: 0
FEVER: 0
DOUBLE VISION: 0
SHORTNESS OF BREATH: 0
DEPRESSION: 0
DIZZINESS: 0
TREMORS: 0
COUGH: 0
ABDOMINAL PAIN: 0
HALLUCINATIONS: 0
NAUSEA: 0
FOCAL WEAKNESS: 0
NECK PAIN: 0
BACK PAIN: 1
NERVOUS/ANXIOUS: 0
CHILLS: 0
HEADACHES: 0
MYALGIAS: 0
VOMITING: 0
FLANK PAIN: 0
TINGLING: 0

## 2019-04-27 NOTE — PROGRESS NOTES
Assumed care at 1900, bedside report received from day shift RN. Pt. Is SR on the monitor. Initial assessment completed, orders reviewed, call light within reach, bed alarm is in use, and hourly rounding in place. POC addressed with patient, no additional questions at this time. Pt is in BL soft wrist, L soft ankle restraints. Order verified and documentation in place. Pt educated at this time.

## 2019-04-27 NOTE — PROGRESS NOTES
2 RN skin check complete with HUGO Song    · Devices in place BL soft wrist, L ankle restraints, dasilva  · Skin assessed under devices yes  · Confirmed wounds found on no  · New potential wounds noted on n/a  · Wound consult: no  · Wound reported and pictures uploaded: no    Scattered bruising and scabbing  Bruising under restraints from pulling/resisting  Sacrum pink and blanching  Heels pink and blanching  Slight rash where statlock was on pt     · The following interventions in place: release from restraints q2 hours with ROM, dasilva care PRN

## 2019-04-27 NOTE — CONSULTS
PSYCHIATRIC CONSULTATION:  Reason for admission: Lithium toxicity  Reason for consult: Lithium toxicity and psychiatric management  Requesting Physician: Francisco Jauregui M.D.  Supervising Physician:Conner Helm M.D.    Legal status:  initiated and extended    HPI:   Patient is a 38 y.o. female with history of bipolar disorder who presented to the hospital as a transfer from [facility] who presented to the emergency department for evaluation of possible lithium toxicity.  Patient currently takes lithium, Seroquel, and clonazepam. She was recently restarted on lithium on 4/17/2019 and denies taking any extra doses.  Patient presented with a wrist laceration and suicidal ideation.  Labs indicate acute kidney injury with BUN/creatinine of 24/2.7.  Lithium level at time of admission was 3.4.  Patient is reported to have been very agitated earlier this morning needing three-point restraint and given 400 mg p.o. [place], 2 mg IV Ativan, 5 mg IM Haldol.  Upon speaking to this patient this morning she was minimally cooperative and exhibiting disorganized thought process at this time.  Patient not oriented to person, place, time.  Patient unable to explain her current status and how her lithium level got so high.  Most information was obtained from the mother patient provided verbal consent.  Mother reports that patient has frequently been breaking into houses and stealing pills and she believes that the lithium toxicity is most likely because patient has poor insight and took lithium pills believing that it would help her back pain.  Unable to verify this at this time and patient has extensive history of suicide attempts and self-harm.  For this reason will initiate an extended legal hold for danger to self and reevaluate at future time when patient more able to engage in interview.    Psychiatric Review of Systems:current symptoms as reported by pt.  Depression: Patient's disorganized thought process makes her  currently unable to engage in interview  Annabella: Patient's disorganized thought process makes her currently unable to engage in interview  Anxiety/Panic Attacks: Patient's disorganized thought process makes her currently unable to engage in interview  PTSD symptom: Patient's disorganized thought process makes her currently unable to engage in interview  Psychosis: Patient's disorganized thought process makes her currently unable to engage in interview    Medical Review of Systems:  Constitutional: Patient's disorganized thought process makes her currently unable to engage in interview  HENT: Patient's disorganized thought process makes her currently unable to engage in interview  Eyes: Patient's disorganized thought process makes her currently unable to engage in interview  Respiratory: Patient's disorganized thought process makes her currently unable to engage in interview  Cardiovascular: Patient's disorganized thought process makes her currently unable to engage in interview  Gastrointestinal: Patient's disorganized thought process makes her currently unable to engage in interview  Genitourinary: Patient's disorganized thought process makes her currently unable to engage in interview  Musculoskeletal: Patient reporting back pain  Skin: Patient's disorganized thought process makes her currently unable to engage in interview  Neurological: Patient's disorganized thought process makes her currently unable to engage in interview  Psychiatric/Behavioral: See above for psych review of systems    TBIs: Patient's disorganized thought process makes her currently unable to engage in interview  SZs: Patient's disorganized thought process makes her currently unable to engage in interview  Strokes: Patient's disorganized thought process makes her currently unable to engage in interview  Thyroid: Patient's disorganized thought process makes her currently unable to engage in interview  Diabetes: Patient's disorganized thought  "process makes her currently unable to engage in interview  Cardiovascular disease: Patient's disorganized thought process makes her currently unable to engage in interview    Psychiatric Examination:  Vitals: /64   Pulse 79   Temp 36.1 °C (96.9 °F) (Temporal)   Resp 18   Ht 1.778 m (5' 10\")   Wt 100.4 kg (221 lb 5.5 oz)   LMP 04/14/2019   SpO2 97%   Breastfeeding? No   BMI 31.76 kg/m²   Musculoskeletal: wnl  Appearance: well-developed, obese, unkempt and Patient in restraints for agitation previously.  Not agitated during interview., disorganized, poor eye contact and Minimally cooperative .  Mild tremor of hand noted mother reports that this is normal for this patient.  Thoughts: Patient's disorganized thought process makes her currently unable to engage in interview, non-linear, incoherent, not goal-oriented and disorganized  Speech: mumbling and Nonsensical disorganized speech.  Responds \"okay \"to question of what her name is several times.  Mood: Patient's disorganized thought process makes her currently unable to engage in interview  Affect: blunted  SI/HI: Patient's disorganized thought process makes her currently unable to engage in interview  Alert/Oriented: Alert.  Not oriented to person, place, time.  Memory: Patient's disorganized thought process makes her currently unable to engage in interview  Fund of Knowledge: Patient's disorganized thought process makes her currently unable to engage in interview  Insight/Judgement into symptoms: poor  Neurological Testing: Patient's disorganized thought process makes her currently unable to engage in interview      Past Psychiatric Hx:  Diagnoses: Patient's mother reports history of bipolar  Inpatient: Patient's mother reports 5 previous hospitalizations  Outpatient: Patient's mother reports that patient recently started seeing another physician through tele-psych as she just moved in with her mother up in [facility] and has met with this physician once 2 " weeks ago, this physician started her on lithium.  Medications:   Current home meds are as follows:  Lithium 300 mg p.o. twice daily  Gabapentin 300 mg p.o. Nightly  Seroquel 800 mg p.o. Nightly  Klonopin 1 mg p.o. twice daily  Suicide attempts: Mother reports that patient has had several suicide attempts by several methods and long history of self injurious activity.  Most recent suicide attempt was a few months ago.    Family Psychiatric Hx:  Patient's disorganized thought process makes her currently unable to engage in interview    Social Hx:   Patient's mother reports the patient graduated high school early.  Has a  degree.  Last job was working in a [employer] 8 years ago.  Has been living with a friend in an apartment until 1 month ago at which time she moved in with the mother because she was no longer taking care of herself.  Mother reports that patient has history of breaking into houses and stealing pills and hopes to find oxycodone.    Drug/Alcohol/Tobacco Hx:  Drugs: Patient has past history of heroin use and currently uses opioids frequently for back pain  Alcohol: Patient's disorganized thought process makes her currently unable to engage in interview  Tobacco: Patient's disorganized thought process makes her currently unable to engage in interview    Medical Hx: labs, MARS, medications, etc were reviewed. Only those findings of potential interest to psychiatry are noted below:    Medical Conditions:   Past Medical History:   Diagnosis Date   • Allergy    • Anemia    • Anxiety    • Arthritis    • At risk for sleep apnea    • Bipolar disorder (HCC)    • Depression    • Head ache    • Jaundice     Dec 2018   • Renal disorder    • Substance abuse (HCC)     heroin quit 2003     Allergies:   Allergies   Allergen Reactions   • Bee Swelling     Medications (currently prescribed at [facility]):    Current Facility-Administered Medications:   •  acetaminophen (TYLENOL) tablet 650 mg, 650 mg, Oral, Q6HRS  PRN, Irtqa Ilyas, D.O., 650 mg at 04/27/19 0017  •  NS infusion, , Intravenous, Continuous, Margret Hunt M.D., Last Rate: 125 mL/hr at 04/27/19 0752  •  clonazePAM (KLONOPIN) tablet 1 mg, 1 mg, Oral, BID, Irtqjustice Ilyas, D.O., 1 mg at 04/27/19 0800  •  cyanocobalamin (VITAMIN B-12) injection 1,000 mcg, 1,000 mcg, Intramuscular, Q30 DAYS, Margret Hunt M.D., 1,000 mcg at 04/26/19 1253  •  ferrous sulfate tablet 325 mg, 325 mg, Oral, QDAY with Breakfast, Margret Hunt M.D., 325 mg at 04/27/19 0800  •  lidocaine (LIDODERM) 5 % 1 Patch, 1 Patch, Transdermal, Q24HR, Margret Hunt M.D., 1 Patch at 04/27/19 1058  •  QUEtiapine (SEROQUEL) tablet 400 mg, 400 mg, Oral, Nightly, Margret Hunt M.D., Stopped at 04/26/19 2100  •  haloperidol lactate (HALDOL) injection 5 mg, 5 mg, Intramuscular, Q6HRS PRN, Margret Hunt M.D., 5 mg at 04/26/19 1659  •  senna-docusate (PERICOLACE or SENOKOT S) 8.6-50 MG per tablet 2 Tab, 2 Tab, Oral, BID, Stopped at 04/26/19 0600 **AND** polyethylene glycol/lytes (MIRALAX) PACKET 1 Packet, 1 Packet, Oral, QDAY PRN **AND** magnesium hydroxide (MILK OF MAGNESIA) suspension 30 mL, 30 mL, Oral, QDAY PRN **AND** bisacodyl (DULCOLAX) suppository 10 mg, 10 mg, Rectal, QDAY PRN, Irtqa Ilyas, D.O.  •  Respiratory Care per Protocol, , Nebulization, Continuous RT, Irtqa Ilyas, D.O.  •  heparin injection 5,000 Units, 5,000 Units, Subcutaneous, Q8HRS, Maxx Christensen D.O., 5,000 Units at 04/27/19 0553  Labs:  Recent Labs      04/25/19   1240  04/25/19   2040  04/26/19   0442   WBC  10.4  8.8  6.6   RBC  4.46  4.35  3.62*   HEMOGLOBIN  11.9*  11.7*  9.8*   HEMATOCRIT  37.0*  37.0  30.6*   MCV  83.0  85.1  84.5   MCH  26.7*  26.9*  27.1   MCHC  32.2*  31.6*  32.0*   RDW  15.9*  50.2*  49.4   PLATELETCT  180  165  134*   MPV  11.0*  11.4  11.4     Recent Labs      04/26/19   1723  04/26/19   2355  04/27/19   0853   SODIUM  137  137  142   POTASSIUM  3.5*  4.2  4.7   CHLORIDE  116*  119*  122*   CO2   14*  18*  17*   GLUCOSE  144*  94  83   BUN  15  14  12   CREATININE  1.40  1.24  1.10   CALCIUM  9.1  9.1  9.1                 Recent Labs      19   1233   METHADONE  Negative   OPIATES  Negative        ECG:   Test Date:  2019   Pt Name:    HARESH SOSA              Department: [id number]   MRN:        [id number]                      Room:       T8   Gender:     Female                       Technician: DEVON   :        1980                   Requested By:RICH GEORGE   Order #:    [id number]                    Reading MD: Esteban Otto MD     Measurements   Intervals                                Axis   Rate:       68                           P:          62   TX:         156                          QRS:        78   QRSD:       88                           T:          57   QT:         404   QTc:        430     Cranial Imaging: reviewed  DX-CHEST-PORTABLE (1 VIEW)   Final Result         1. No acute cardiopulmonary abnormalities are identified.      CT-HEAD W/O   Final Result         1. No acute intracranial abnormality. No evidence of acute intracranial hemorrhage or mass lesion.               US-FOREIGN FILM ULTRASOUND   Final Result          ASSESSMENT:   Patient is a 38 y.o. female with history of bipolar disorder who presented to the hospital as a transfer from [facility] who presented to the emergency department for evaluation of possible lithium toxicity.  Patient currently presenting as confused and not oriented to person, place, time.  Patient has been exhibiting waxing and waning of agitation and orientation.  Delirium likely at this time.  Recommend EEG to evaluate for delirium.    -Delirium  -History of bipolar disorder  -Opioid use disorder, severe    PLAN:  -Continue Seroquel 400 mg p.o. Nightly  -Continue Klonopin 1 mg p.o. twice daily  -It is acknowledged that both Klonopin and Seroquel can be contributing to this patient's delirium at this time although patient has been  quite agitated and could withdraw from benzodiazepines so we will continue at this time with plan to slow taper if necessary.  -Recommend EEG at this time for evaluation for delirium  -One-to-one sitter.  Patient may use unit phone.  Patient may have visitors.  Patient may not have personal belongings.  - Legal status: initiated and extended as patient is believed to be a danger to self as evidenced by lithium toxicity and extensive history of suicide attempts in the past with most recent suicide attempt a few months ago.  Thank you for the consult.  We will follow

## 2019-04-27 NOTE — PROGRESS NOTES
Assumed care of patient, bedside report received from night rn. Updated on POC, call light within reach and fall precautions in place. Bed locked and in lowest position. Patient instructed to call for assistance before getting out of bed. All questions answered, no other needs at this time.

## 2019-04-27 NOTE — DISCHARGE PLANNING
[facility] sent Behavioral Health referral, including medical clearance,  to all appropriate inpatient Behavioral Health facilities.

## 2019-04-27 NOTE — PROGRESS NOTES
Ibeth from Lab called with critical result of Lithium 2.1 at 0029. Critical lab result read back to Ibeth.

## 2019-04-27 NOTE — PROGRESS NOTES
This RN responded to patient yelling and screaming into the hallway from hospital room. Pt was standing at the edge of the bed, dasilva and PIV line intact, yelling incoherent phrases. Bedside RN and Elda RN were attempting to get patient back into bed for safety. Pt complied with request initially but then became more aggressive as staff insisted that she stay in bed for her own safety given her mentation and medical devices. Pt then had a fit of aggression, charged towards the door. This RN, Marisol RN, and bedside RN stopped patient from leaving the room and forcefully taking out medical lines. Pt was escorted back to the bed, two point restraints were applied. Subsequently pt started kicking her legs out of the bed and was then placed in three point restraints. Physicians were paged and updated on pt status. VIKI Wagner was present in the room helping to calm and diffuse the situation.

## 2019-04-27 NOTE — PROGRESS NOTES
Internal Medicine Interval Note  Note Author: Margret Hunt M.D.     Name Kelly Arroyo     1980   Age/Sex 38 y.o. female   MRN [id number]   Code Status Full     After 5PM or if no immediate response to page, please call for cross-coverage  Attending/Team: Dr. Jauregui/ANJEL Cruz See Patient List for primary contact information  Call [phone] to page    1st Call - Day Intern (R1):   Dr. Hunt 2nd Call - Day Sr. Resident (R2/R3):   Dr. Lester         Reason for interval visit  (Principal Problem)   Lithium toxicity      Interval Problem Daily Status Update  (24 hours, problem oriented, brief subjective history, new lab/imaging data pertinent to that problem)   - Very agitated in the later half of the day yesterday, needed 3 point restraint. Calmed down after 400 mg PO Seroquel, 2 mg IV Ativan, 5 mg IM Haldol. No acute events overnight, slept at night.  - Complains of Low back pain, chronic. No active Narcotic prescription found as outpatient. On admission a bottle of norco was found in her possession which was labeled under her Grand parent (Bartolome Tripp) one empty bottle of Ibuprofen under Bailey Tripp, however she denied taking them. UDS negative on . Will avoid Narcotics because of other ongoing issues and addiction potential at this point unless alternatives inadequate.   - Lithium trending down, Kidney function improving. Cont NS @ 125 ml/hour. I/O in last 24 hour +320. Trend Lithium and BMP Q12H  - As patient is altered and can't self care at this point, CAN'T LEAVE AMA  - Psychiatry on board, appreciate help.     Review of Systems   Reason unable to perform ROS: Limited due to patient condition.   Constitutional: Negative for chills and fever.   Eyes: Negative for blurred vision and double vision.   Respiratory: Negative for cough and shortness of breath.    Cardiovascular: Negative for chest pain and leg swelling.   Gastrointestinal: Negative for abdominal pain, nausea and  vomiting.   Genitourinary: Positive for frequency. Negative for dysuria, flank pain, hematuria and urgency.   Musculoskeletal: Positive for back pain (Chronic). Negative for falls, joint pain, myalgias and neck pain.   Neurological: Negative for dizziness, tingling, tremors, focal weakness and headaches.        Chronic dyskinetic movements of whole body    Psychiatric/Behavioral: Negative for depression, hallucinations and suicidal ideas. The patient is not nervous/anxious.        Disposition/Barriers to discharge:   Inpatient for Lithium toxicity and TAMIKO    Consultants/Specialty  Nephrology  PCP: MOSHE FranklinNJatinderPJatinder      Quality Measures  Quality-Core Measures   Reviewed items::  EKG reviewed, Labs reviewed, Medications reviewed and Radiology images reviewed  Gibbs catheter::  No Gibbs  DVT prophylaxis pharmacological::  Heparin          Physical Exam       Vitals:    04/27/19 0014 04/27/19 0518 04/27/19 0910 04/27/19 1249   BP: (!) 99/60 104/69 107/64 129/98   Pulse: 75 67 79 79   Resp: 18 18 18 18   Temp: 36.2 °C (97.2 °F) 36.1 °C (97 °F) 36.1 °C (96.9 °F) 36.2 °C (97.1 °F)   TempSrc: Temporal Temporal Temporal Temporal   SpO2:   97% 98%   Weight:       Height:         Body mass index is 31.76 kg/m². Weight: 100.4 kg (221 lb 5.5 oz)  Oxygen Therapy:  Pulse Oximetry: 98 %, O2 (LPM): 0, O2 Delivery: None (Room Air)    Physical Exam   Constitutional:   No acute distress, able to participate in conversation, however appears confused. Generalized tremulous movements of whole body which is chronic    HENT:   Head: Normocephalic and atraumatic.   Eyes: Pupils are equal, round, and reactive to light. Conjunctivae and EOM are normal. Right eye exhibits no discharge. Left eye exhibits no discharge. Right eye exhibits no nystagmus. Left eye exhibits no nystagmus.   Neck: Normal range of motion. Neck supple.   Cardiovascular: Normal rate, regular rhythm, normal heart sounds and intact distal pulses.    Pulmonary/Chest:  Effort normal and breath sounds normal. No respiratory distress. She has no wheezes.   Abdominal: Soft. Bowel sounds are normal. She exhibits distension (No ascitis). There is no tenderness. There is no guarding.   Musculoskeletal: Normal range of motion. She exhibits no edema or tenderness.   Neurological: She is alert. She has normal sensation, normal strength and normal reflexes. No cranial nerve deficit. She exhibits abnormal muscle tone (Slightly increased tone). She has a normal Cerebellar Exam and a normal Romberg Test. Gait normal.   Patient has impaired cognition, unsure if that's her baseline or this is from lithium toxicity. Mocha couldn't be done properly. Will repeat once Lithium level is within therapeutic range.    Skin: Skin is dry. No erythema.   Psychiatric: Affect and judgment normal.   Strongly denies any SI/HI             Assessment/Plan     * Lithium toxicity- (present on admission)   Assessment & Plan    Patient is a 38-year old female with history of bipolar disorder who presents to the hospital with lithium toxicity and acute renal failure. Toxicity may be from excessive lithium ingestion in combination with continued NSAID use. On exam patient is tremulous but does not exhibit dysarthria or other extrapyramidal symptoms. Nephrology was consulted and deemed no urgent need for dialysis. TSH WNL, On admission QTc 466  - Continue Tele, monitor Qtc.   - Fall/Aspiration/Seizure precautions   - IVF  ml/ hour  - Trend Lithium and BMP Q12H   - Gibbs for accurate Is/Os   - Continue Seroquel at current dose (400 mg daily) and Haldol 5 mg IM PRN Q6H for agitation.   - Psych on board, appreciate help     Hypokalemia   Assessment & Plan    Potassium 3.1 on admission. Trending up, replete as needed  Continue telemetry     Acute metabolic encephalopathy   Assessment & Plan    Secondary to lithium toxicity  - CT head without contrast to rule out other intracranial causes : Negative  - Continue to  monitor      Acute kidney injury (HCC)- (present on admission)   Assessment & Plan    Likely secondary to Lithium toxicity in the setting of NSAID use. FeNa is 0.4%. Renal ultrasound showed possible nephrolithiasis but patient does not have any corresponding symptoms. Cr 2.7 at outside facility. Baseline is ~0.7.  - No urgent need for dialysis as per nephrology   - Kidney function trending down   - Gibbs for accurate Is/Os, at risk for developing nephrogenic DI  - Continue to monitor kidney function and electrolytes      Angiomyolipoma of right kidney   Assessment & Plan    Seen on ultrasound.  - outpatient follow up      History of suicidal tendencies- (present on admission)   Assessment & Plan    Patient denies any suicidal or homicidal ideation at this time.   - Sitter at bedside for history of high risk behavior      History of iron deficiency anemia   Assessment & Plan    Patient with Hb at 11.7 on admission. At baseline.   - Low B12, IM dose given today, gastrin level pending  - Low Ferritin level, ordered celiac panel. Stated supplementation.   - FOBT pending.      Chronic back pain- (present on admission)   Assessment & Plan    Patient reports that it is from her large breasts.   - Tylenol prn, Lidocaine patch.      Bipolar disorder (HCC)- (present on admission)   Assessment & Plan    - On [place] as outpatient.   - Holding Lithium   - Continue home Klonopin and Seroquel.    - Consulted psychiatry today for increased agitation during the day. Appreciate help.

## 2019-04-27 NOTE — CARE PLAN
Problem: Safety  Goal: Will remain free from falls    Intervention: Assess risk factors for falls  Fall precautions in place. Bed in lowest position. Non-skid socks in place. Personal possessions within reach. Mobility sign on door. Bed-alarm on. Call light within reach. Pt educated regarding fall prevention and states understanding. Pt is in BL soft wrist, soft L ankle restraints. Order verified and documentation in place. Pt educated at this time.      Problem: Knowledge Deficit  Goal: Knowledge of disease process/condition, treatment plan, diagnostic tests, and medications will improve  Pt educated regarding plan of care and medications. All questions answered. Reoriented PRN

## 2019-04-27 NOTE — PROGRESS NOTES
2 RN Skin Check with Jamee RN    Bilateral ears red and blanching   Bilateral elbows pink and blanching  Scattered bruising, scratches and scabbing  Red spot below left knee, blanching  Sacrum pink and blanching

## 2019-04-27 NOTE — PROGRESS NOTES
Bedside report received from night shift RN. Pt is lethargic but responsive. Pt. Is in bed.  Pt.s bed is locked, in lowest position, with bed alarms on.  Pt has bilateral wrist restraints and L ankle restraint with active order.

## 2019-04-27 NOTE — NON-PROVIDER
"       Internal Medicine Medical Student Note  Note Author: Obie Ramirezvibha, Student    Name Kelly Arroyo     1980   Age/Sex 38 y.o. female   MRN [id number]   Code Status FULL     Reason for interval visit  (Principal Problem)   Lithium toxicity    Interval Problem Daily Status Update  (problem status, last 24 hours, new history, new data )     Altered mental status:  -Pt was severely agitated and disoriented overnight per nursing documention--requiring sedation with ativan and haldol  -This AM, almost entirely non-verbal, would not respond to orientation questions apart from her name with repeated prompting  -Pt able to follow commands,  strength 5/5 bilaterally without asterixis  -Reflexes 2+ throughout, no UMN signs    History per mother:  -States patient stole alcohol and grandmother's 2019 narcotic pain medication because \"her pain was so bad\"  -Urine drug screen pan-negative 2019    Further chart review/patient timeline:     -12/10/2018: Pt admitted to ICU in [place], [place] for apparent accidental APAP  overdose, acute liver failure, and acute metabolic encephalopathy. Pt was intubated  for severe encephalopathy      -2018: Pt deemed to not be a transplant candidate      -2018      Mr Brain Without Contrast      Redemonstrated abnormal T2 signal in the bilateral insula, bilateral posterior   temporal lobes, bilateral thalami and bilateral limbic systems. Subtle new   focus in the right aspect of the splenium of the corpus callosum. The   differential diagnosis I ncludes hepatic encephalopathy and viral encephalitis.   Preliminary result dictated by: GAGE CENTENO      -2018: Discharged to home care     -2018: Patient admitted to [facility] for intractable N/V and altered mental  Status    \"Discussed with patient's PCP who states patient was quite alert when seen   2 days prior.    Etiology not entirely clear but LFTs have not changed, if anything, have   improved " "and ammonia level is low\"     -Oppenheimer, Margaret MD    CT head w/o contrast     Abnormal hypodensity in the right temporal lobe in the middle cranial    fossa.  Further evaluation with MRI recommended.      -3/5/2019 ([facility], [place])    -Documented that pt moved in with mother, before had been living on her own    -Bipolar disorder documented to be stable on Seroquel 400 mg QHS alone    -MSE WNL     -4/17/2019 (Psychiatric)    -\"I'm terrible, anxious having mood swings depression vs hypomanic.\"     -Lithium started (300 mg QD)     -Seroquel increased to 800 mg     -Klonopin started 1 mg BID      Physical Exam       Vitals:    04/26/19 2010 04/27/19 0014 04/27/19 0518 04/27/19 0910   BP: 109/74 (!) 99/60 104/69 107/64   Pulse: 84 75 67 79   Resp: 18 18 18 18   Temp: 36.1 °C (96.9 °F) 36.2 °C (97.2 °F) 36.1 °C (97 °F) 36.1 °C (96.9 °F)   TempSrc: Temporal Temporal Temporal Temporal   SpO2:    97%   Weight: 100.4 kg (221 lb 5.5 oz)      Height:         Body mass index is 31.76 kg/m². Weight: 100.4 kg (221 lb 5.5 oz)  Oxygen Therapy:  Pulse Oximetry: 97 %, O2 (LPM): 0, O2 Delivery: None (Room Air)    Physical Exam    GEN: NAD, Obtunded  HEENT: normocephalic, PERRLA, EOMI  CARDIO: RRR, no murmurs, rubs or gallops  PULMONARY: CTAB, no adventitious breath sounds  GI: Abdomen soft nontender, no organomegaly, normoactive bowel sounds  EXTREMITIES: WWP, atraumatic, no edema  SKIN: Intact without rashes or erythema  NEURO: As above  ORIENTATION:As above      Assessment/Plan     1. Altered mental status (Li toxicity vs encephalopathy vs delirium vs BP)  -Patient became increasing disoriented and agitated last night despite improving lithium levels and renal function  -Head CT WNL  -Electrolyte perturbations also continue to improve apart from hyperchloremia  -Further chart review shows mental status and BP disorder to be stable as recently as March of this year     Plan:  -Continue to address Li " toxicity with rehydration  -Nephro not recommending HD  -Consider psych consultation to evaluate for delirium vs encephalopathy vs primary psychiatric condition  -Appreciate guidance from psych on management of agitation in the context of delirium and lithium toxicity  -Consider formal cognitive assessment by ST once lithium levels are therapeutic and if mental status returns to baseline     2. Bipolar disorder  -Consult psychiatry for inpatient evaluation  -Appreciate guidance on appropriate medication changes if indicated     3. Normocytic Anemia, hypo-proliferative  -Baseline Hgb appears to be between 10-12 g/dL (documented back to August 2018)  -Decrease in Hgb from baseline observed today (9.8) likely to be dilutional 2/2 aggressive IVF administration  -Lab eval              -B12 mildly low              -Reticulocyte count 1.4%                          -Reticulocyte index = 0.68 = hypo-proliferative (based on normal Hct of 42)              -Thyroid studies WNL              -Folate WNL              -Ferritin/Iron studies WNL              -Gastrin pending              -Celiac panel pending              -FOBT marked as needs to be collected     Plan:  -Supplement B12  -Supplement Fe  -Await results of pending labs

## 2019-04-27 NOTE — CARE PLAN
Problem: Safety  Goal: Will remain free from injury  Outcome: PROGRESSING AS EXPECTED  Bed locked, in lowest position with side rails up. Bed alarm on. Pt in bilateral wrist restraints and left ankle restraint with active order.     Problem: Skin Integrity  Goal: Risk for impaired skin integrity will decrease  Outcome: PROGRESSING AS EXPECTED  Skin will be assessed every 2 hours under restraints.

## 2019-04-27 NOTE — PROGRESS NOTES
unr updated on pts status. md to put orders in.  At this time pt remains confused and is agressively pulling on restraints and has a visible bruise to her left wrist from attempting to pull arm out. Restraints are loose enough to ensure circulation. Rn is remaining at bedside

## 2019-04-28 LAB
ANION GAP SERPL CALC-SCNC: 4 MMOL/L (ref 0–11.9)
ANION GAP SERPL CALC-SCNC: 5 MMOL/L (ref 0–11.9)
BUN SERPL-MCNC: 7 MG/DL (ref 8–22)
BUN SERPL-MCNC: 8 MG/DL (ref 8–22)
CALCIUM SERPL-MCNC: 9.6 MG/DL (ref 8.5–10.5)
CALCIUM SERPL-MCNC: 9.6 MG/DL (ref 8.5–10.5)
CHLORIDE SERPL-SCNC: 117 MMOL/L (ref 96–112)
CHLORIDE SERPL-SCNC: 117 MMOL/L (ref 96–112)
CO2 SERPL-SCNC: 16 MMOL/L (ref 20–33)
CO2 SERPL-SCNC: 18 MMOL/L (ref 20–33)
CREAT SERPL-MCNC: 0.77 MG/DL (ref 0.5–1.4)
CREAT SERPL-MCNC: 0.78 MG/DL (ref 0.5–1.4)
GASTRIN SERPL-MCNC: 45 PG/ML (ref 0–100)
GLUCOSE SERPL-MCNC: 119 MG/DL (ref 65–99)
GLUCOSE SERPL-MCNC: 122 MG/DL (ref 65–99)
LITHIUM SERPL-MCNC: 1.4 MMOL/L (ref 0.6–1.2)
POTASSIUM SERPL-SCNC: 4 MMOL/L (ref 3.6–5.5)
POTASSIUM SERPL-SCNC: 4 MMOL/L (ref 3.6–5.5)
SODIUM SERPL-SCNC: 138 MMOL/L (ref 135–145)
SODIUM SERPL-SCNC: 139 MMOL/L (ref 135–145)

## 2019-04-28 PROCEDURE — A9270 NON-COVERED ITEM OR SERVICE: HCPCS | Performed by: STUDENT IN AN ORGANIZED HEALTH CARE EDUCATION/TRAINING PROGRAM

## 2019-04-28 PROCEDURE — 700111 HCHG RX REV CODE 636 W/ 250 OVERRIDE (IP): Performed by: STUDENT IN AN ORGANIZED HEALTH CARE EDUCATION/TRAINING PROGRAM

## 2019-04-28 PROCEDURE — 700102 HCHG RX REV CODE 250 W/ 637 OVERRIDE(OP): Performed by: STUDENT IN AN ORGANIZED HEALTH CARE EDUCATION/TRAINING PROGRAM

## 2019-04-28 PROCEDURE — 99233 SBSQ HOSP IP/OBS HIGH 50: CPT | Mod: GC | Performed by: INTERNAL MEDICINE

## 2019-04-28 PROCEDURE — 80178 ASSAY OF LITHIUM: CPT

## 2019-04-28 PROCEDURE — 80048 BASIC METABOLIC PNL TOTAL CA: CPT

## 2019-04-28 PROCEDURE — 700101 HCHG RX REV CODE 250: Performed by: STUDENT IN AN ORGANIZED HEALTH CARE EDUCATION/TRAINING PROGRAM

## 2019-04-28 PROCEDURE — 83516 IMMUNOASSAY NONANTIBODY: CPT

## 2019-04-28 PROCEDURE — 770020 HCHG ROOM/CARE - TELE (206)

## 2019-04-28 PROCEDURE — 36415 COLL VENOUS BLD VENIPUNCTURE: CPT

## 2019-04-28 RX ORDER — POTASSIUM CHLORIDE 20 MEQ/1
40 TABLET, EXTENDED RELEASE ORAL ONCE
Status: COMPLETED | OUTPATIENT
Start: 2019-04-28 | End: 2019-04-28

## 2019-04-28 RX ORDER — CELECOXIB 100 MG/1
100 CAPSULE ORAL 2 TIMES DAILY
Status: DISCONTINUED | OUTPATIENT
Start: 2019-04-28 | End: 2019-04-29 | Stop reason: HOSPADM

## 2019-04-28 RX ADMIN — LIDOCAINE 1 PATCH: 50 PATCH TOPICAL at 10:50

## 2019-04-28 RX ADMIN — HEPARIN SODIUM 5000 UNITS: 5000 INJECTION, SOLUTION INTRAVENOUS; SUBCUTANEOUS at 20:28

## 2019-04-28 RX ADMIN — FERROUS SULFATE TAB 325 MG (65 MG ELEMENTAL FE) 325 MG: 325 (65 FE) TAB at 05:44

## 2019-04-28 RX ADMIN — QUETIAPINE FUMARATE 400 MG: 200 TABLET ORAL at 20:28

## 2019-04-28 RX ADMIN — CELECOXIB 100 MG: 100 CAPSULE ORAL at 20:28

## 2019-04-28 RX ADMIN — CELECOXIB 100 MG: 100 CAPSULE ORAL at 13:53

## 2019-04-28 RX ADMIN — POTASSIUM CHLORIDE 40 MEQ: 1500 TABLET, EXTENDED RELEASE ORAL at 13:53

## 2019-04-28 RX ADMIN — CLONAZEPAM 1 MG: 1 TABLET ORAL at 05:44

## 2019-04-28 RX ADMIN — HEPARIN SODIUM 5000 UNITS: 5000 INJECTION, SOLUTION INTRAVENOUS; SUBCUTANEOUS at 13:53

## 2019-04-28 RX ADMIN — POTASSIUM CHLORIDE 40 MEQ: 1500 TABLET, EXTENDED RELEASE ORAL at 05:44

## 2019-04-28 RX ADMIN — HEPARIN SODIUM 5000 UNITS: 5000 INJECTION, SOLUTION INTRAVENOUS; SUBCUTANEOUS at 05:44

## 2019-04-28 ASSESSMENT — ENCOUNTER SYMPTOMS
ABDOMINAL PAIN: 0
HALLUCINATIONS: 0
DOUBLE VISION: 0
VOMITING: 0
NAUSEA: 0
DIZZINESS: 0
FOCAL WEAKNESS: 0
TREMORS: 0
NECK PAIN: 0
FLANK PAIN: 0
HEADACHES: 0
FEVER: 0
CHILLS: 0
BACK PAIN: 1
MYALGIAS: 0
DEPRESSION: 0
COUGH: 0
NERVOUS/ANXIOUS: 0
FALLS: 0
TINGLING: 0
SHORTNESS OF BREATH: 0
BLURRED VISION: 0

## 2019-04-28 NOTE — PROGRESS NOTES
2 RN skin check complete with HUGO Garaz    · Devices in place bilateral wrist soft restraints and left ankle soft restraint, dasilva  · Skin assessed under devices Yes  · Confirmed wounds found on None  · New potential wounds noted on None  · Wound consult: no  · Wound reported and pictures uploaded: no    Elbows pink and blanching.  Generalized scattered bruising and scratches. Bruising under all restraints due to pulling.   Heels pink and blanching.  Sacrum pink and blanching.     · The following interventions in place: Assessment q2 hours for restraints. Repositioning.

## 2019-04-28 NOTE — PROGRESS NOTES
called via spectra link to inform me patient's lab results:    -Lithium: 1.4  -Potassium: 4  -Sodium: 139

## 2019-04-28 NOTE — PROGRESS NOTES
Assumed care at 1900, bedside report received from day shift RN. Pt. Is SR on the monitor. Initial assessment completed, orders reviewed, call light within reach, bed alarm is in use, and hourly rounding in place. POC addressed with patient, no additional questions at this time. Pt is in bilateral soft wrist and L soft ankle restraints. Order verified and documentation in place. Pt educated at this time.Legal hold verified; 1 to 1 sitter in place; only plastic utensils with meals; room free from potentially dangerous items.

## 2019-04-28 NOTE — PROGRESS NOTES
Assumed care of patient, bedside report received from samuel Vasquez RN. Updated on POC, call light within reach and fall precautions in place. Bed locked and in lowest position. Patient instructed to call for assistance before getting out of bed. All questions answered, no other needs at this time.

## 2019-04-28 NOTE — PROGRESS NOTES
Internal Medicine Interval Note  Note Author: Margret Hunt M.D.     Name Kelly Arroyo     1980   Age/Sex 38 y.o. female   MRN [id number]   Code Status Full     After 5PM or if no immediate response to page, please call for cross-coverage  Attending/Team: Dr. Jauregui/ANJEL Cruz See Patient List for primary contact information  Call [phone] to page    1st Call - Day Intern (R1):   Dr. Hunt 2nd Call - Day Sr. Resident (R2/R3):   Dr. Lester         Reason for interval visit  (Principal Problem)   Lithium toxicity      Interval Problem Daily Status Update  (24 hours, problem oriented, brief subjective history, new lab/imaging data pertinent to that problem)   - No overnight events. Creatinine WNL now, Lithium 1.6. Dc'd IVF.  - Psychiatry following, appreciated help. Initiated Legal hold.   - EEG ordered to rule out any seizure process  - Patient lot calmer today, dc'd restrains. Will see how she does. PRN Haldol for agitation.   - Dc'd Gibbs, on voiding trial. Output 2.6 L in last 24 hours.   - K 3.2 in the AM, total 80 meQ supplement today.   - Cont Seroquel and Klonopin dose as per psych, will try to slow taper Klonopin if necessary.   - Continued back back, ordered Celebrex 100 mg BID osteoarthritis dose, kidney function already back to baseline.     Review of Systems   Reason unable to perform ROS: Limited due to patient condition.   Constitutional: Negative for chills and fever.   Eyes: Negative for blurred vision and double vision.   Respiratory: Negative for cough and shortness of breath.    Cardiovascular: Negative for chest pain and leg swelling.   Gastrointestinal: Negative for abdominal pain, nausea and vomiting.   Genitourinary: Positive for frequency. Negative for dysuria, flank pain, hematuria and urgency.   Musculoskeletal: Positive for back pain (Chronic). Negative for falls, joint pain, myalgias and neck pain.   Neurological: Negative for dizziness, tingling, tremors, focal  weakness and headaches.        Chronic dyskinetic movements of whole body    Psychiatric/Behavioral: Negative for depression, hallucinations and suicidal ideas. The patient is not nervous/anxious.        Disposition/Barriers to discharge:   Inpatient for Lithium toxicity and TAMIKO    Consultants/Specialty  Nephrology  PCP: MOSHE FranklinNDAVION      Quality Measures  Quality-Core Measures   Reviewed items::  EKG reviewed, Labs reviewed, Medications reviewed and Radiology images reviewed  Gibbs catheter::  No Gibbs  DVT prophylaxis pharmacological::  Heparin          Physical Exam       Vitals:    04/27/19 1651 04/27/19 2000 04/28/19 0036 04/28/19 0508   BP: 138/91 113/86 123/91 104/62   Pulse: 82 83 92 76   Resp: 18 18 18 18   Temp: 36.3 °C (97.3 °F) 37.4 °C (99.3 °F) 37.7 °C (99.8 °F) 36.6 °C (97.8 °F)   TempSrc: Temporal Temporal Temporal Temporal   SpO2: 96% 96% 97% 96%   Weight:  108.2 kg (238 lb 8.6 oz)     Height:         Body mass index is 34.23 kg/m². Weight: 108.2 kg (238 lb 8.6 oz)  Oxygen Therapy:  Pulse Oximetry: 96 %, O2 (LPM): 0, O2 Delivery: None (Room Air)    Physical Exam   Constitutional:   No acute distress, able to participate in conversation, however appears confused. Generalized tremulous movements of whole body which is chronic    HENT:   Head: Normocephalic and atraumatic.   Eyes: Pupils are equal, round, and reactive to light. Conjunctivae and EOM are normal. Right eye exhibits no discharge. Left eye exhibits no discharge. Right eye exhibits no nystagmus. Left eye exhibits no nystagmus.   Neck: Normal range of motion. Neck supple.   Cardiovascular: Normal rate, regular rhythm, normal heart sounds and intact distal pulses.    Pulmonary/Chest: Effort normal and breath sounds normal. No respiratory distress. She has no wheezes.   Abdominal: Soft. Bowel sounds are normal. She exhibits distension (No ascitis). There is no tenderness. There is no guarding.   Musculoskeletal: Normal range of  motion. She exhibits no edema or tenderness.   Neurological: She is alert. She has normal sensation, normal strength and normal reflexes. No cranial nerve deficit. She exhibits abnormal muscle tone (Slightly increased tone). She has a normal Cerebellar Exam and a normal Romberg Test. Gait normal.   Patient has impaired cognition, probably mixed underlying psychosis and lithium toxicity.    Skin: Skin is dry. No erythema.   Psychiatric: Affect and judgment normal.   Strongly denies any SI/HI             Assessment/Plan     * Lithium toxicity- (present on admission)   Assessment & Plan    Patient is a 38-year old female with history of bipolar disorder who presents to the hospital with lithium toxicity and acute renal failure. Toxicity may be from excessive lithium ingestion in combination with continued NSAID use. On exam patient is tremulous but does not exhibit dysarthria or other extrapyramidal symptoms. Nephrology was consulted and deemed no urgent need for dialysis. TSH WNL, On admission QTc 466  - Lithium trending down.   - EEG for delirium, to rule out any seizure activity, low threshold.   - Continue Tele, monitor Qtc.   - Fall/Aspiration/Seizure precautions   - Continue Seroquel at current dose (400 mg daily) and Haldol 5 mg IM PRN Q6H for agitation.   - Psych on board, appreciate help.      Hypokalemia   Assessment & Plan    Potassium 3.1 on admission. Trending up, replete as needed  Continue telemetry     Acute metabolic encephalopathy   Assessment & Plan    Secondary to lithium toxicity  - CT head without contrast to rule out other intracranial causes : Negative  - Continue to monitor   - Ordered EEG to rule out any seizure activity     Acute kidney injury (HCC)- (present on admission)   Assessment & Plan    Secondary to Lithium toxicity in the setting of NSAID use, back to baseline. Renal ultrasound showed possible nephrolithiasis but patient does not have any corresponding symptoms.   - I/Os  - Continue  to monitor kidney function and electrolytes      Angiomyolipoma of right kidney   Assessment & Plan    Seen on ultrasound.  - outpatient follow up      History of suicidal tendencies- (present on admission)   Assessment & Plan    -Patient denies any suicidal or homicidal ideation at this time. H/o prev Suicidal attempts.   -However due to lithium toxicity and somewhat altered mental status with severe agitation she is at risk to harm self and others.   -Legal hold initiated by psych.      History of iron deficiency anemia   Assessment & Plan    Patient with Hb at 11.7 on admission. At baseline.   - Low B12, IM dose given today, gastrin level normal.  - Low Ferritin level, Celiac panel normal. Stated supplementation.   - FOBT pending.      Chronic back pain- (present on admission)   Assessment & Plan    Patient reports that it is from her large breasts.   - Tylenol prn, Lidocaine patch. Will avoid narcotics in setting of her severe opoid use disorder unless alternatives are inadequate.   - Scheduled Celebrex 100 mg BID osteoarthritis dose.      Bipolar disorder (HCC)- (present on admission)   Assessment & Plan    - On [place] as outpatient.   - Holding Lithium   - Continue home Klonopin and Seroquel.    - Psychiatry on board, appreciate help.

## 2019-04-28 NOTE — CARE PLAN
Problem: Safety - Medical Restraint  Goal: Remains free of injury from restraints (Restraint for Interference with Medical Device)  INTERVENTIONS:  1. Determine that other, less restrictive measures have been tried or would not be effective before applying the restraint  2. Evaluate the patient's condition at the time of restraint application  3. Inform patient/family regarding the reason for restraint  4. Q2H: Monitor safety, psychosocial status, comfort, nutrition and hydration   Pt bilateral soft wrist and L soft ankle restraint. Q2 done for shift    Problem: Pain Management  Goal: Pain level will decrease to patient's comfort goal  Tylenol given x1 for headache with good relief

## 2019-04-29 ENCOUNTER — PATIENT OUTREACH (OUTPATIENT)
Dept: HEALTH INFORMATION MANAGEMENT | Facility: OTHER | Age: 39
End: 2019-04-29

## 2019-04-29 VITALS
DIASTOLIC BLOOD PRESSURE: 92 MMHG | TEMPERATURE: 98.9 F | HEART RATE: 86 BPM | OXYGEN SATURATION: 96 % | BODY MASS INDEX: 34.81 KG/M2 | WEIGHT: 243.17 LBS | RESPIRATION RATE: 16 BRPM | SYSTOLIC BLOOD PRESSURE: 122 MMHG | HEIGHT: 70 IN

## 2019-04-29 LAB
ANION GAP SERPL CALC-SCNC: 4 MMOL/L (ref 0–11.9)
BUN SERPL-MCNC: 5 MG/DL (ref 8–22)
CALCIUM SERPL-MCNC: 9.4 MG/DL (ref 8.5–10.5)
CHLORIDE SERPL-SCNC: 118 MMOL/L (ref 96–112)
CO2 SERPL-SCNC: 18 MMOL/L (ref 20–33)
CREAT SERPL-MCNC: 0.73 MG/DL (ref 0.5–1.4)
GLUCOSE SERPL-MCNC: 107 MG/DL (ref 65–99)
LITHIUM SERPL-MCNC: 1.1 MMOL/L (ref 0.6–1.2)
POTASSIUM SERPL-SCNC: 3.8 MMOL/L (ref 3.6–5.5)
SODIUM SERPL-SCNC: 140 MMOL/L (ref 135–145)

## 2019-04-29 PROCEDURE — A9270 NON-COVERED ITEM OR SERVICE: HCPCS | Performed by: STUDENT IN AN ORGANIZED HEALTH CARE EDUCATION/TRAINING PROGRAM

## 2019-04-29 PROCEDURE — 95816 EEG AWAKE AND DROWSY: CPT | Mod: 26 | Performed by: PSYCHIATRY & NEUROLOGY

## 2019-04-29 PROCEDURE — 80178 ASSAY OF LITHIUM: CPT

## 2019-04-29 PROCEDURE — 80048 BASIC METABOLIC PNL TOTAL CA: CPT

## 2019-04-29 PROCEDURE — 4A00X4Z MEASUREMENT OF CENTRAL NERVOUS ELECTRICAL ACTIVITY, EXTERNAL APPROACH: ICD-10-PCS | Performed by: PSYCHIATRY & NEUROLOGY

## 2019-04-29 PROCEDURE — 99239 HOSP IP/OBS DSCHRG MGMT >30: CPT | Mod: GC | Performed by: INTERNAL MEDICINE

## 2019-04-29 PROCEDURE — 700111 HCHG RX REV CODE 636 W/ 250 OVERRIDE (IP): Performed by: STUDENT IN AN ORGANIZED HEALTH CARE EDUCATION/TRAINING PROGRAM

## 2019-04-29 PROCEDURE — 700102 HCHG RX REV CODE 250 W/ 637 OVERRIDE(OP): Performed by: STUDENT IN AN ORGANIZED HEALTH CARE EDUCATION/TRAINING PROGRAM

## 2019-04-29 PROCEDURE — 36415 COLL VENOUS BLD VENIPUNCTURE: CPT

## 2019-04-29 PROCEDURE — 700101 HCHG RX REV CODE 250: Performed by: STUDENT IN AN ORGANIZED HEALTH CARE EDUCATION/TRAINING PROGRAM

## 2019-04-29 PROCEDURE — 99232 SBSQ HOSP IP/OBS MODERATE 35: CPT | Performed by: PSYCHIATRY & NEUROLOGY

## 2019-04-29 PROCEDURE — 95816 EEG AWAKE AND DROWSY: CPT | Performed by: PSYCHIATRY & NEUROLOGY

## 2019-04-29 RX ORDER — CLONAZEPAM 1 MG/1
1 TABLET ORAL DAILY
Qty: 15 TAB | Refills: 0 | Status: SHIPPED | OUTPATIENT
Start: 2019-04-30 | End: 2019-04-29

## 2019-04-29 RX ORDER — CLONAZEPAM 1 MG/1
1 TABLET ORAL DAILY
Qty: 20 TAB | Refills: 0 | Status: SHIPPED | OUTPATIENT
Start: 2019-04-30 | End: 2019-05-20

## 2019-04-29 RX ORDER — FERROUS SULFATE 325(65) MG
325 TABLET ORAL
Qty: 30 TAB | Refills: 3 | Status: SHIPPED | OUTPATIENT
Start: 2019-04-30 | End: 2019-05-31

## 2019-04-29 RX ORDER — CHOLECALCIFEROL (VITAMIN D3) 125 MCG
500 CAPSULE ORAL DAILY
Qty: 30 TAB | Refills: 3 | Status: SHIPPED | OUTPATIENT
Start: 2019-04-29 | End: 2019-05-31

## 2019-04-29 RX ORDER — QUETIAPINE FUMARATE 400 MG/1
400 TABLET, FILM COATED ORAL EVERY EVENING
Qty: 30 TAB | Refills: 3 | Status: SHIPPED | OUTPATIENT
Start: 2019-04-29 | End: 2019-07-18 | Stop reason: SDUPTHER

## 2019-04-29 RX ADMIN — FERROUS SULFATE TAB 325 MG (65 MG ELEMENTAL FE) 325 MG: 325 (65 FE) TAB at 05:42

## 2019-04-29 RX ADMIN — LIDOCAINE 1 PATCH: 50 PATCH TOPICAL at 14:05

## 2019-04-29 RX ADMIN — CELECOXIB 100 MG: 100 CAPSULE ORAL at 05:42

## 2019-04-29 RX ADMIN — CELECOXIB 100 MG: 100 CAPSULE ORAL at 18:21

## 2019-04-29 RX ADMIN — CLONAZEPAM 1 MG: 1 TABLET ORAL at 05:42

## 2019-04-29 RX ADMIN — HEPARIN SODIUM 5000 UNITS: 5000 INJECTION, SOLUTION INTRAVENOUS; SUBCUTANEOUS at 06:00

## 2019-04-29 RX ADMIN — HEPARIN SODIUM 5000 UNITS: 5000 INJECTION, SOLUTION INTRAVENOUS; SUBCUTANEOUS at 14:02

## 2019-04-29 ASSESSMENT — ENCOUNTER SYMPTOMS
SHORTNESS OF BREATH: 0
DIZZINESS: 0
COUGH: 0
DOUBLE VISION: 0
MYALGIAS: 0
BLURRED VISION: 0
CHILLS: 0
FOCAL WEAKNESS: 0
FEVER: 0
VOMITING: 0
ABDOMINAL PAIN: 0
TREMORS: 0
FALLS: 0
BACK PAIN: 1
HEADACHES: 0
FLANK PAIN: 0
DEPRESSION: 0
HALLUCINATIONS: 0
NERVOUS/ANXIOUS: 0
NECK PAIN: 0
TINGLING: 0
NAUSEA: 0

## 2019-04-29 NOTE — PROGRESS NOTES
Assumed care of pt, she is alert and oriented to self and place. Discussed safety and the need  To call before getting up. Bed is locked in lowest position and call light/personal belongings are within reach.

## 2019-04-29 NOTE — PROCEDURES
EEG 04/29/19 5:00 PM    ELECTROENCEPHALOGRAM REPORT      Referring provider: Ludwig Grayson M.D.    DOS:   4/29/2019      INDICATION:  Kelly Arroyo 38 y.o. female presenting with questionable seizures    PT XFERRED FROM [facility] POSS LITHIUM TOXICITY. WRIST LACERATIONS, SUICIDAL IDDEATIONS. EEG FOR POSS DELERIUM. PT HOSPITALIZED IN [place] STATE LAST MONTH FOR KIDNEY DISEASE, ARF, COMA. PYSCH HX. CT BRAIN NEGATIVE    CURRENT ANTIEPILEPTIC REGIMEN: Klonopin     DURATION: 29 minutes      TECHNIQUE: A 30-channel electroencephalogram (EEG) was performed in accordance with the international 10-20 system. This digital study was reviewed in bipolar and referential montages. The recording examined the patient during wakeful, drowsy and sleep states.         DESCRIPTION OF THE RECORD:  During the awake state, background shows symmetrical 10-11 Hz alpha activity posteriorly with amplitude of 70 mV.  There was reactivity with eye opening/closure.  Normal anterior-posterior gradient was noted with faster beta frequencies seen anteriorly.  During drowsiness, high-amplitude delta frequencies were seen. There are FIRDA bursts even when the patient was awake    During the sleep state, background shows diffuse high-amplitude 4-5 Hz delta activity.  Symmetrical high-amplitude sleep spindles and vertex sharp activities were seen in the central leads.    ACTIVATION PROCEDURES:   Hyperventilation was not performed  Intermittent Photic stimulation was performed in a stepwise fashion from 1 to 30 Hz and elicited a normal response (photic driving), most noticeable in the posterior leads.      ICTAL AND/OR INTERICTAL FINDINGS:   No focal or generalized epileptiform activity was noted. FIRDA(+)  No seizures were recorded during the study.     EVENT(S):  NONE    EKG: sampling review of EKG recording demonstrated regular rhythm      INTERPRETATION:        ________________________________________________________________________    This is mildly abnormal routine video EEG recording in the awake and drowsy/sleep state(s).    This scalp EEG denotes      1. Diffuse nonspecific cortical dysfunction - FIRDA (+) mild to moderate degree--this patten remains not specific - advise clinical correlation regarding further management. This does not necessary mean the patient needs seizure medication     This nonspecific abnormalities could be secondary to metabolic, toxic, polypharmacy or even post-ictal    There are no epileptiform discharges in this record    Of note, unremarkable EEG does not completely exclude the diagnosis  of seizures since seizure is an episodic phenomena.  Clinical correlation may help     If clinical suspicion of seizure remains high.  Prolonged EEG   monitoring may be of help.    EEG 04/29/19 5:21 PM    ________________________________________________________________________  ________________________________________________________________________    ________________________________________________________________________

## 2019-04-29 NOTE — NON-PROVIDER
Internal Medicine Medical Student Note  Note Author: Obie Goddard, Student    Name Kelly Arroyo     1980   Age/Sex 38 y.o. female   MRN [id number]   Code Status FULL     Reason for interval visit  (Principal Problem)   Lithium toxicity, altered mental status, mental health issues    Interval Problem Daily Status Update  (problem status, last 24 hours, new history, new data )     1. Altered mental status  -Pt is alert, oriented to person and place, and conversant today  -Per mom, patient is back to her cognitive baseline    2. Lithium toxicity  -Patient's neurological symptoms appear to have resolved--no tremulousness, no clonus  -DDK persists (left worse than right)    3. Mental health--Bipolar I  -Patient states her mood is good. Affect is appropriate and congruent  -Patient denies SI/HI  -Psychiatry has yet to see her today    4. Social considerations  -Patient and mom states they would like to establish care with both a PCP and a psychiatrist in [place]. They state that prior to admission she had only been seen via telemedicine    Physical Exam       Vitals:    19 0005 19 0200 19 0410 19 0813   BP: 104/69  114/94 118/75   Pulse: (!) 110 85 87 98   Resp: 16  16 17   Temp: 36.4 °C (97.5 °F)  36.6 °C (97.8 °F) 36.5 °C (97.7 °F)   TempSrc: Temporal  Temporal Temporal   SpO2: 95%  95% 96%   Weight:       Height:         Body mass index is 34.89 kg/m². Weight: 110.3 kg (243 lb 2.7 oz)  Oxygen Therapy:  Pulse Oximetry: 96 %, O2 (LPM): 0, O2 Delivery: None (Room Air)    Physical Exam    GEN: NAD  HEENT: normocephalic, PERRLA, EOMI  CARDIO: RRR, no murmurs, rubs or gallops  PULMONARY: CTAB, no adventitious breath sounds  GI: Abdomen soft nontender, no organomegaly, normoactive bowel sounds  EXTREMITIES: WWP, atraumatic, no edema  SKIN: Intact without rashes or erythema  NEURO:   -CN II-XII grossly intact   -Pt unable able to perform rapid, alternating had movements  "despite  understanding instructions (L>R)   -Heel to shin WNL   -Mild difficulty with finger to nose test   -Minor speech slurring  ORIENTATION: Oriented to self and place  PSYCH:   -Patient denies SI/HI   -Patient states mood is \"good\"   -Affect is appropriate and congruent   -Patient has fair insight into her circumstances    Recent Labs      04/28/19   0928  04/28/19   1844  04/29/19   0008   SODIUM  139  138  140   POTASSIUM  4.0  4.0  3.8   CHLORIDE  117*  117*  118*   CO2  18*  16*  18*   GLUCOSE  122*  119*  107*   BUN  8  7*  5*     Recent Labs      04/28/19   0928  04/28/19   1844  04/29/19   0008   CREATININE  0.78  0.77  0.73         Assessment/Plan     1. Altered mental status  -Pt alert, oriented, and conversant today  -Pt's cognition is back to baseline per herself as well as mom    Plan:  -Psychiatry following, appreciate guidance  -Psychiatry had requested an EEG on 4/27 which has yet to be done    2. Lithium toxicity  -Neurologic exam markedly improved  -DDK persists, unsure if baseline, it would be appropriate to follow up outpatient  -Lithium level today 1.1  -Renal function test reassuring that kidney injury as only acute   -Cr: 0.73   -BUN: 5    Plan:  -Appreciate psychiatry recommendations for BP medications. Concern for future medication overdoses  -Medically cleared for discharge    3. Mental health, Bipolar I  -No SI/HI  -Normal mood and affect  -No evidence of acute peggy at this time    Plan:  -Per psychiatry    4. Social Consideration  -Patient and mom state that she needs to establish care with both a PCP and Psychiatrist in [place] if possible    Plan:  -SW Consult    "

## 2019-04-29 NOTE — PROGRESS NOTES
"       Internal Medicine Interval Note  Note Author: Stoney Kwon M.D.     Name Kelly Arroyo     1980   Age/Sex 38 y.o. female   MRN [id number]   Code Status Full     After 5PM or if no immediate response to page, please call for cross-coverage  Attending/Team: Dr. Thompson/ANJEL Cruz See Patient List for primary contact information  Call [phone] to page    1st Call - Day Intern (R1):   Dr. Kwon 2nd Call - Day Sr. Resident (R2/R3):   Dr. Lester         Reason for interval visit  (Principal Problem)   Lithium toxicity      Interval Problem Daily Status Update  (24 hours, problem oriented, brief subjective history, new lab/imaging data pertinent to that problem)     No acute overnight events. Patient is resting comfortably in bed, mental status improved, AAOx4, reports feeling \"great\", is eager to be able to go home, explained that she is on a legal hold per Psych.    EEG ordered , still pending, otherwise patient is medically stable.     Review of Systems   Reason unable to perform ROS: Limited due to patient condition.   Constitutional: Negative for chills and fever.   Eyes: Negative for blurred vision and double vision.   Respiratory: Negative for cough and shortness of breath.    Cardiovascular: Negative for chest pain and leg swelling.   Gastrointestinal: Negative for abdominal pain, nausea and vomiting.   Genitourinary: Positive for frequency. Negative for dysuria, flank pain, hematuria and urgency.   Musculoskeletal: Positive for back pain (Chronic). Negative for falls, joint pain, myalgias and neck pain.   Neurological: Negative for dizziness, tingling, tremors, focal weakness and headaches.        Chronic dyskinetic movements of whole body    Psychiatric/Behavioral: Negative for depression, hallucinations and suicidal ideas. The patient is not nervous/anxious.        Disposition/Barriers to discharge:   Inpatient for Lithium toxicity and " TAMIKO    Consultants/Specialty  Nephrology  PCP: MOSHE FranklinNJatinderPJatinder      Quality Measures  Quality-Core Measures   Reviewed items::  EKG reviewed, Labs reviewed, Medications reviewed and Radiology images reviewed  Gibbs catheter::  No Gibbs  DVT prophylaxis pharmacological::  Heparin          Physical Exam       Vitals:    04/29/19 0005 04/29/19 0200 04/29/19 0410 04/29/19 0813   BP: 104/69  114/94 118/75   Pulse: (!) 110 85 87 98   Resp: 16  16 17   Temp: 36.4 °C (97.5 °F)  36.6 °C (97.8 °F) 36.5 °C (97.7 °F)   TempSrc: Temporal  Temporal Temporal   SpO2: 95%  95% 96%   Weight:       Height:         Body mass index is 34.89 kg/m². Weight: 110.3 kg (243 lb 2.7 oz)  Oxygen Therapy:  Pulse Oximetry: 96 %, O2 (LPM): 0, O2 Delivery: None (Room Air)    Physical Exam   Constitutional: She is oriented to person, place, and time.   No acute distress, mental status improved, AAOx4   HENT:   Head: Normocephalic and atraumatic.   Eyes: Pupils are equal, round, and reactive to light. Conjunctivae and EOM are normal. Right eye exhibits no discharge. Left eye exhibits no discharge. Right eye exhibits no nystagmus. Left eye exhibits no nystagmus.   Neck: Normal range of motion. Neck supple.   Cardiovascular: Normal rate, regular rhythm, normal heart sounds and intact distal pulses.    Pulmonary/Chest: Effort normal and breath sounds normal. No respiratory distress. She has no wheezes.   Abdominal: Soft. Bowel sounds are normal. She exhibits no distension (No ascitis). There is no tenderness. There is no guarding.   Musculoskeletal: Normal range of motion. She exhibits no edema or tenderness.   Neurological: She is alert and oriented to person, place, and time. She has normal sensation, normal strength and normal reflexes. No cranial nerve deficit. She exhibits normal muscle tone (Slightly increased tone). She has a normal Cerebellar Exam and a normal Romberg Test.   Skin: Skin is dry. No erythema.   Psychiatric: Affect and  judgment normal.   Strongly denies any SI/HI             Assessment/Plan     * Lithium toxicity- (present on admission)   Assessment & Plan    Improved. Patient is a 38-year old female with history of bipolar disorder who presents to the hospital with lithium toxicity and acute renal failure. Toxicity may be from excessive lithium ingestion in combination with continued NSAID use. On exam patient is tremulous but does not exhibit dysarthria or other extrapyramidal symptoms. Nephrology was consulted and deemed no urgent need for dialysis. TSH WNL, On admission QTc 466  - Lithium trending down, back within therapeutic range 4/29 at 1.1  - Fall/Aspiration/Seizure precautions   - Continue Seroquel at current dose (400 mg daily) and Haldol 5 mg IM PRN Q6H for agitation.   - Psych on board, appreciate help.      Hypokalemia   Assessment & Plan    Potassium 3.1 on admission. Trending up, replete as needed  Continue telemetry     Acute metabolic encephalopathy   Assessment & Plan    Secondary to lithium toxicity  - CT head without contrast to rule out other intracranial causes : Negative  - Continue to monitor   - Ordered EEG to rule out any seizure activity     Acute kidney injury (HCC)- (present on admission)   Assessment & Plan    Secondary to Lithium toxicity in the setting of NSAID use, back to baseline. Renal ultrasound showed possible nephrolithiasis but patient does not have any corresponding symptoms.   - I/Os  - Continue to monitor kidney function and electrolytes      Angiomyolipoma of right kidney   Assessment & Plan    Seen on ultrasound.  - outpatient follow up      History of suicidal tendencies- (present on admission)   Assessment & Plan    -Patient denies any suicidal or homicidal ideation at this time. H/o prev Suicidal attempts.   -However due to lithium toxicity and somewhat altered mental status with severe agitation she is at risk to harm self and others.   -Legal hold initiated by psych.      History  of iron deficiency anemia   Assessment & Plan    Patient with Hb at 11.7 on admission. At baseline.   - Low B12, IM dose given today, gastrin level normal.  - Low Ferritin level, Celiac panel normal. Stated supplementation.   - FOBT pending.      Chronic back pain- (present on admission)   Assessment & Plan    Patient reports that it is from her large breasts.   - Tylenol prn, Lidocaine patch. Will avoid narcotics in setting of her severe opoid use disorder unless alternatives are inadequate.   - Scheduled Celebrex 100 mg BID osteoarthritis dose.      Bipolar disorder (HCC)- (present on admission)   Assessment & Plan    - On [place] as outpatient.   - Holding Lithium   - Continue home Klonopin and Seroquel.    - Psychiatry on board, appreciate help.

## 2019-04-29 NOTE — PSYCHIATRY
PSYCHIATRIC FOLLOW-UP:(established)  Reason for admission: Lithium toxicity  Reason for consult: Lithium toxicity and psychiatric management  Requesting Physician: Francisco Jauregui M.D.            *HPI: Pt reports that about 2 weeks ago her psychiatrist started her on lithium 300mg Po BID. She denied overdosing or attempting suicide. Pt does not know why lithium was added to regimen, but stated that she was not sleeping, with increased energy and racing thoughts. She reported having maniac episode since she was a teenager, with first episode at 16yo. She has been in a psychiatric hospital for peggy and depression treatment. She also has a hx of cutting, last time 15 years ago, and 5 SA as a teenager. Pt currently denies any acute symptoms of peggy, depression, anxiety or psychosis. She denied any SI/HI and is very happy that her mother has been visiting her daily and caring for her. She is looking forward to go home with her mother. She is also happy that friend have come to visit her in the hospital. She reported that she was recently hospitalized for 2 months for liver disease in [place].     As per mother, pt has a hx of Bipolar dso, with multiple hospitalizations since 15 yo for peggy and depression. She does not know why pt was recently started on Lithium and stated that on the visit she not having any acute signs of peggy or depression, with exception  of insomnia. When asked if pt overdosed on meds, she said she was having back pain, but did not elaborate further. She denied any SI statements prior to admission and does not believe she attempted suicide. She currently denies any safety concerns with her discharge, but would like assitance with getting pt connected to mental health services.  Pt will be staying with mother.         Medical ROS (as pertinent):   Chronic back pain , all other system reviewed and negative                 *Psychiatric Examination:  Vitals:   Vitals:    04/29/19 0813   BP: 118/75  "  Pulse: 98   Resp: 17   Temp: 36.5 °C (97.7 °F)   SpO2: 96%       General Appearance:  woman, obese, appears older than stated age, poor grooming, fair hygiene  Calm and cooperative  Good eye contact  Abnormal Movements: no psychomotor retardation or agitation  Gait and Posture: normal posture  Speech: normal volume, tone and rhythm  Thought processes:linear and goal oriented  Associations: no lose associations  Abnormal or Psychotic Thoughts: denies any AVH, no delusions or paranoia elicited  Judgement and Insight: good/good  Orientation: oriented to person, place and time  Recent and Remote Memory: fair/fair  Attention Span and Concentration: intact  Language: fluent in English  Mood and Affect:\"I am feeling great!\", full range, appropriate   SI/HI:denies any SI/HI     Current Facility-Administered Medications   Medication Dose Route Frequency Provider Last Rate Last Dose   • celecoxib (CELEBREX) capsule 100 mg  100 mg Oral BID Margret Hunt M.D.   100 mg at 04/29/19 0542   • acetaminophen (TYLENOL) tablet 650 mg  650 mg Oral Q6HRS PRN Maxx Christensen DJatinderOJatinder   650 mg at 04/27/19 2141   • clonazePAM (KLONOPIN) tablet 1 mg  1 mg Oral DAILY Rebecca Lester M.D.   1 mg at 04/29/19 0542   • cyanocobalamin (VITAMIN B-12) injection 1,000 mcg  1,000 mcg Intramuscular Q30 DAYS Margret Hunt M.D.   1,000 mcg at 04/26/19 1253   • ferrous sulfate tablet 325 mg  325 mg Oral QDAY with Breakfast Margret Hunt M.D.   325 mg at 04/29/19 0542   • lidocaine (LIDODERM) 5 % 1 Patch  1 Patch Transdermal Q24HR Margret Hunt M.D.   1 Patch at 04/29/19 1405   • QUEtiapine (SEROQUEL) tablet 400 mg  400 mg Oral Nightly Margret Hunt M.D.   400 mg at 04/28/19 2028   • haloperidol lactate (HALDOL) injection 5 mg  5 mg Intramuscular Q6HRS PRN Margret Hunt M.D.   5 mg at 04/26/19 1659   • senna-docusate (PERICOLACE or SENOKOT S) 8.6-50 MG per tablet 2 Tab  2 Tab Oral BID Maxx Christensen D.O.   Stopped at 04/26/19 0600 "    And   • polyethylene glycol/lytes (MIRALAX) PACKET 1 Packet  1 Packet Oral QDAY PRN FELIPA Cazares.O.        And   • magnesium hydroxide (MILK OF MAGNESIA) suspension 30 mL  30 mL Oral QDAY PRN Ruth AnntFELIPA Pack.O.        And   • bisacodyl (DULCOLAX) suppository 10 mg  10 mg Rectal QDAY PRN Ruth AnntqFELIPA Jo.O.       • Respiratory Care per Protocol   Nebulization Continuous RT Ruth AnntqFELIPA Jo.O.       • heparin injection 5,000 Units  5,000 Units Subcutaneous Q8HRS Ruth AnntFELIPA Pack.O.   5,000 Units at 04/29/19 1402     Recent Results (from the past 72 hour(s))   LITHIUM    Collection Time: 04/26/19  5:23 PM   Result Value Ref Range    Lithium 2.2 (HH) 0.6 - 1.2 mmol/L   Basic Metabolic Panel (BMP)    Collection Time: 04/26/19  5:23 PM   Result Value Ref Range    Sodium 137 135 - 145 mmol/L    Potassium 3.5 (L) 3.6 - 5.5 mmol/L    Chloride 116 (H) 96 - 112 mmol/L    Co2 14 (L) 20 - 33 mmol/L    Glucose 144 (H) 65 - 99 mg/dL    Bun 15 8 - 22 mg/dL    Creatinine 1.40 0.50 - 1.40 mg/dL    Calcium 9.1 8.5 - 10.5 mg/dL    Anion Gap 7.0 0.0 - 11.9   ESTIMATED GFR    Collection Time: 04/26/19  5:23 PM   Result Value Ref Range    GFR If  51 (A) >60 mL/min/1.73 m 2    GFR If Non  42 (A) >60 mL/min/1.73 m 2   LITHIUM    Collection Time: 04/26/19 11:55 PM   Result Value Ref Range    Lithium 2.1 (HH) 0.6 - 1.2 mmol/L   Basic Metabolic Panel (BMP)    Collection Time: 04/26/19 11:55 PM   Result Value Ref Range    Sodium 137 135 - 145 mmol/L    Potassium 4.2 3.6 - 5.5 mmol/L    Chloride 119 (H) 96 - 112 mmol/L    Co2 18 (L) 20 - 33 mmol/L    Glucose 94 65 - 99 mg/dL    Bun 14 8 - 22 mg/dL    Creatinine 1.24 0.50 - 1.40 mg/dL    Calcium 9.1 8.5 - 10.5 mg/dL    Anion Gap 0.0 0.0 - 11.9   ESTIMATED GFR    Collection Time: 04/26/19 11:55 PM   Result Value Ref Range    GFR If  58 (A) >60 mL/min/1.73 m 2    GFR If Non  48 (A) >60 mL/min/1.73 m 2   EKG    Collection Time: 04/27/19   7:32 AM   Result Value Ref Range    Report       [facility]    Test Date:  2019  Pt Name:    HARESH SOSA              Department: 183  MRN:        [id number]                      Room:       T837  Gender:     Female                       Technician: DEVON  :        1980                   Requested By:RICH GEORGE  Order #:    [id number]                    Reading MD: Esteban Otto MD    Measurements  Intervals                                Axis  Rate:       68                           P:          62  MD:         156                          QRS:        78  QRSD:       88                           T:          57  QT:         404  QTc:        430    Interpretive Statements  SINUS RHYTHM  LOW VOLTAGE, PRECORDIAL LEADS  Compared to ECG 2019 03:43:40  No significant changes    Electronically Signed On 2019 10:25:34 PDT by Esteban Otto MD     LITHIUM    Collection Time: 19  8:53 AM   Result Value Ref Range    Lithium 2.1 (HH) 0.6 - 1.2 mmol/L   Basic Metabolic Panel (BMP)    Collection Time: 19  8:53 AM   Result Value Ref Range    Sodium 142 135 - 145 mmol/L    Potassium 4.7 3.6 - 5.5 mmol/L    Chloride 122 (H) 96 - 112 mmol/L    Co2 17 (L) 20 - 33 mmol/L    Glucose 83 65 - 99 mg/dL    Bun 12 8 - 22 mg/dL    Creatinine 1.10 0.50 - 1.40 mg/dL    Calcium 9.1 8.5 - 10.5 mg/dL    Anion Gap 3.0 0.0 - 11.9   ESTIMATED GFR    Collection Time: 19  8:53 AM   Result Value Ref Range    GFR If African American >60 >60 mL/min/1.73 m 2    GFR If Non African American 55 (A) >60 mL/min/1.73 m 2   Basic Metabolic Panel (BMP)    Collection Time: 19  8:54 PM   Result Value Ref Range    Sodium 133 (L) 135 - 145 mmol/L    Potassium 3.2 (L) 3.6 - 5.5 mmol/L    Chloride 115 (H) 96 - 112 mmol/L    Co2 17 (L) 20 - 33 mmol/L    Glucose 110 (H) 65 - 99 mg/dL    Bun 9 8 - 22 mg/dL    Creatinine 0.87 0.50 - 1.40 mg/dL    Calcium 9.2 8.5 - 10.5 mg/dL    Anion Gap 1.0 0.0 - 11.9   LITHIUM     Collection Time: 04/27/19  8:54 PM   Result Value Ref Range    Lithium 1.6 (HH) 0.6 - 1.2 mmol/L   ESTIMATED GFR    Collection Time: 04/27/19  8:54 PM   Result Value Ref Range    GFR If African American >60 >60 mL/min/1.73 m 2    GFR If Non African American >60 >60 mL/min/1.73 m 2   LITHIUM    Collection Time: 04/28/19  9:28 AM   Result Value Ref Range    Lithium 1.4 (H) 0.6 - 1.2 mmol/L   Basic Metabolic Panel    Collection Time: 04/28/19  9:28 AM   Result Value Ref Range    Sodium 139 135 - 145 mmol/L    Potassium 4.0 3.6 - 5.5 mmol/L    Chloride 117 (H) 96 - 112 mmol/L    Co2 18 (L) 20 - 33 mmol/L    Glucose 122 (H) 65 - 99 mg/dL    Bun 8 8 - 22 mg/dL    Creatinine 0.78 0.50 - 1.40 mg/dL    Calcium 9.6 8.5 - 10.5 mg/dL    Anion Gap 4.0 0.0 - 11.9   ESTIMATED GFR    Collection Time: 04/28/19  9:28 AM   Result Value Ref Range    GFR If African American >60 >60 mL/min/1.73 m 2    GFR If Non African American >60 >60 mL/min/1.73 m 2   Basic Metabolic Panel    Collection Time: 04/28/19  6:44 PM   Result Value Ref Range    Sodium 138 135 - 145 mmol/L    Potassium 4.0 3.6 - 5.5 mmol/L    Chloride 117 (H) 96 - 112 mmol/L    Co2 16 (L) 20 - 33 mmol/L    Glucose 119 (H) 65 - 99 mg/dL    Bun 7 (L) 8 - 22 mg/dL    Creatinine 0.77 0.50 - 1.40 mg/dL    Calcium 9.6 8.5 - 10.5 mg/dL    Anion Gap 5.0 0.0 - 11.9   ESTIMATED GFR    Collection Time: 04/28/19  6:44 PM   Result Value Ref Range    GFR If African American >60 >60 mL/min/1.73 m 2    GFR If Non African American >60 >60 mL/min/1.73 m 2   LITHIUM    Collection Time: 04/29/19 12:08 AM   Result Value Ref Range    Lithium 1.1 0.6 - 1.2 mmol/L   Basic Metabolic Panel    Collection Time: 04/29/19 12:08 AM   Result Value Ref Range    Sodium 140 135 - 145 mmol/L    Potassium 3.8 3.6 - 5.5 mmol/L    Chloride 118 (H) 96 - 112 mmol/L    Co2 18 (L) 20 - 33 mmol/L    Glucose 107 (H) 65 - 99 mg/dL    Bun 5 (L) 8 - 22 mg/dL    Creatinine 0.73 0.50 - 1.40 mg/dL    Calcium 9.4 8.5 - 10.5  mg/dL    Anion Gap 4.0 0.0 - 11.9   ESTIMATED GFR    Collection Time: 04/29/19 12:08 AM   Result Value Ref Range    GFR If African American >60 >60 mL/min/1.73 m 2    GFR If Non African American >60 >60 mL/min/1.73 m 2     Assessment: pt is domiciled with mother,, with a hx of Bipolar dso I, multiple psych hosp, hx of 5SA and SIB, connected to outpatient psychiatric services thought telepsych, who was admitted for management of lithium toxicity. She is currently psychiatrically stable at her baseline. Mother has not safety concers with her discharge. Pt denies any SA or ingesting more lithium that was prescribed, however this provider has concers that pt actually took extra lithium doses, based on report from mother, for back pain. She currently denies any SI/Hi, does not have any signs or symptoms of peggy r depression and is at low acute risk of danger to self or others.     Bipolar dso I, stable     Plan:  Legal hold discontinued  Continue Seroquel 400mg PO at bedtime for mood and klonopin 1mg Po daily for anxiety/mood.  Please provide pt with psychiatric appointment within 2 weeks from discharge for continuity of care.   Pt and family agree with plan  Psychiatry signing off    Thank you for the consult

## 2019-04-30 NOTE — PROGRESS NOTES
Patient A+Ox4, ambulating in room with steady gait, mom at bedside. Using call bell approp, on monitor, on room air, VSS. Denies pain. Legal hold, 1:1 sitter. Continuous monitoring

## 2019-04-30 NOTE — DISCHARGE INSTRUCTIONS
Discharge Instructions    Discharged to home by car with relative. Discharged via wheelchair, hospital escort: Yes.  Special equipment needed: Not Applicable    Be sure to schedule a follow-up appointment with your primary care doctor or any specialists as instructed.     Discharge Plan:   Diet Plan: Discussed  Activity Level: Discussed  Confirmed Follow up Appointment: Appointment Scheduled  Confirmed Symptoms Management: Discussed  Medication Reconciliation Updated: Yes  Influenza Vaccine Indication: Not indicated: Previously immunized this influenza season and > 8 years of age    I understand that a diet low in cholesterol, fat, and sodium is recommended for good health. Unless I have been given specific instructions below for another diet, I accept this instruction as my diet prescription.   Other diet: Regular    Special Instructions: None    · Is patient discharged on Warfarin / Coumadin?   No       Self-Destructive Behavior  Self-destructive behavior includes attitudes and behaviors that can harm, injure, or be destructive to the person who has or performs them. Self-destructive behavior is often used as a coping strategy to deal with painful emotions and stress. It is often habit-forming and difficult to control without help. Self-destructive behavior can result in serious injury or death.   EXAMPLES OF SELF-DESTRUCTIVE BEHAVIOR   · Hurting yourself (self-injury). This includes cutting, scratching, burning, or otherwise injuring yourself to release tension or lessen emotional pain.  · Binge eating and other eating disorders.  · Excessive drinking.  · Drug abuse.  · Shopping sprees, reckless spending, or gambling that causes you to go into debt.  · Any type of addictive behavior. This includes gambling, shoplifting, and other behaviors.  · Not setting healthy limits. This may include depriving yourself of proper rest and nutrition in order to meet the demands of others.  · Intense or chronic feelings of  anxiety, anger, impulsiveness, unworthiness, hopelessness, or loss.  WHAT CAUSES SELF-DESTRUCTIVE BEHAVIOR?  The underlying causes of self-destructive behavior are personal and may include:  · Difficulty managing stress.  · Trauma or abuse (including in past life events).  · Other mental health issues, such as clinical depression and low self-esteem.  WHAT SHOULD I DO IF I WANT TO HURT MYSELF?   · See your health care provider or counselor. He or she will help you recognize the source of your problems, sort out your feelings, and get the help you need.  · Avoid alcohol and drugs.  · Try distracting yourself with other activities (such as chewing gum, exercising, cleaning, or snapping rubber bands) until you are calm and can seek help.  · Learn how to deal with stress in healthy, positive ways (such as with relaxation techniques or exercise).  · Learn to identify and avoid the things that trigger your self-destructive behavior.  · Get involved in a local support group.  SEEK MEDICAL CARE IF:   · You are experiencing suicidal thoughts.  · You experience illness or injury as a result of self-destructive behavior.  SEEK IMMEDIATE MEDICAL CARE IF:   Y our behavior is out of control and your life is in danger. Call :   · The police.    · Your health care provider.  · Local emergency services (911 in [place]  MAKE SURE YOU:  · Understand these instructions.  · Will watch your condition.  · Will get help right away if you are not doing well or get worse.  This information is not intended to replace advice given to you by your health care provider. Make sure you discuss any questions you have with your health care provider.  Document Released: 01/25/2006 Document Revised: 04/10/2017 Document Reviewed: 05/06/2014  Elsevier Interactive Patient Education © 2017 [employer]      Vitamin B12 oral  What is this medicine?  CYANOCOBALAMIN (sye an oh koe BAL a min) is a man made form of vitamin B12. Vitamin B12 is essential in the  development of healthy blood cells, nerve cells, and proteins in the body. It also helps with the metabolism of fats and carbohydrates. It is added to a healthy diet to prevent or treat low vitamin B-12 levels.  This medicine may be used for other purposes; ask your health care provider or pharmacist if you have questions.  What should I tell my health care provider before I take this medicine?  They need to know if you have any of these conditions:  -anemia  -kidney disease  -Leela's disease  -malabsorption disorder  -an unusual or allergic reaction to cyanocobalamin, cobalt, other medicines, foods, dyes, or preservatives  -pregnant or trying to get pregnant  -breast-feeding  How should I use this medicine?  Take this medicine by mouth with a glass of water. Follow the directions on the package or prescription label. If you are taking the tablets, do not chew, cut, or crush this medicine. If using an vitamin solution, use a specially marked spoon or dropper to measure each dose. Ask your pharmacist if you do not have one. Household spoons are not accurate. For best results take this vitamin with food. Take your medicine at regular intervals. Do not take your medicine more often than directed.  Talk to your pediatrician regarding the use of this medicine in children. While this drug may be prescribed for selected conditions, precautions do apply.  Overdosage: If you think you have taken too much of this medicine contact a poison control center or emergency room at once.  NOTE: This medicine is only for you. Do not share this medicine with others.  What if I miss a dose?  If you miss a dose, take it as soon as you can. If it is almost time for your next dose, take only that dose. Do not take double or extra doses.  What may interact with this medicine?  -alcohol  -aminosalicylic acid  -colchicine  -medicines that suppress your bone marrow like chemotherapy, chloramphenicol  This list may not describe all possible  interactions. Give your health care provider a list of all the medicines, herbs, non-prescription drugs, or dietary supplements you use. Also tell them if you smoke, drink alcohol, or use illegal drugs. Some items may interact with your medicine.  What should I watch for while using this medicine?  Follow a healthy diet. Taking a vitamin supplement does not replace the need for a balanced diet. Some foods that have vitamin B-12 naturally are fish, seafood, egg yolk, milk and fermented cheese.  Too much of this vitamin can be unsafe. Talk to your doctor or health care provider about how much is right for you.  What side effects may I notice from receiving this medicine?  Side effects that you should report to your doctor or health care professional as soon as possible:  -allergic reactions like skin rash, itching or hives, swelling of the face, lips, or tongue  -breathing problems  -chest pain, tightness  Side effects that usually do not require medical attention (report to your doctor or health care professional if they continue or are bothersome):  -diarrhea  This list may not describe all possible side effects. Call your doctor for medical advice about side effects. You may report side effects to FDA at 8-881-FDA-2451.  Where should I keep my medicine?  Keep out of the reach of children.  Store at room temperature between 15 and 30 degrees C (59 and 85 degrees F). Protect from heat and light. Throw away any unused medicine after the expiration date.  NOTE: This sheet is a summary. It may not cover all possible information. If you have questions about this medicine, talk to your doctor, pharmacist, or health care provider.  © 2018 Elsevier/Gold Standard (2013-05-21 07:58:17)      Anemia por deficiencia de sheyla - Adultos  (Iron Deficiency Anemia, Adult)  La anemia sucede cuando la cantidad de glóbulos rojos sanos es baja. Con frecuencia, la causa es val cantidad insuficiente de sheyla. [place]verse se denomina anemia por  deficiencia de sheyla y puede provocarle cansancio y dificultad para respirar.  CUIDADOS EN EL HOGAR  · [place] el sheyla según las indicaciones del médico.  · [place] las vitaminas según las indicaciones del médico.  · Consuma alimentos que contengan sheyla. Estos incluyen hígado, carne magra, pan de grano integral, huevos, frutas deshidratadas y vegetales de hojas color leonardo oscuro.  SOLICITE AYUDA DE INMEDIATO SI:  · Pierde el conocimiento (se desmaya).  · Siente dolor en el pecho.  · Tiene malestar estomacal (náuseas) o vomita.  · Tiene mucha dificultad para respirar cuando realiza actividades.  · Se siente débil.  · La frecuencia cardíaca está acelerada.  · Comienza a sudar sin motivo.  · Se siente mareado cuando se levanta de val silla o de la cama.  ASEGÚRESE DE QUE:  · Comprende estas instrucciones.  · Controlará blanco afección.  · Recibirá ayuda de inmediato si no mejora o si empeora.  Esta información no tiene meño fin reemplazar el consejo del médico. Asegúrese de hacerle al médico cualquier pregunta que tenga.  Document Released: 03/23/2012 Document Revised: 05/03/2016 Document Reviewed: 05/21/2017  [employer] Interactive Patient Education © 2017 [employer]      Depression / Suicide Risk    As you are [facility], it is important to learn how to keep safe from harming yourself.    Recognize the warning signs:  · Abrupt changes in personality, positive or negative- including increase in energy   · Giving away possessions  · Change in eating patterns- significant weight changes-  positive or negative  · Change in sleeping patterns- unable to sleep or sleeping all the time   · Unwillingness or inability to communicate  · Depression  · Unusual sadness, discouragement and loneliness  · Talk of wanting to die  · Neglect of personal appearance   · Rebelliousness- reckless behavior  · Withdrawal from people/activities they love  · Confusion- inability to concentrate     If you or a loved  one observes any of these behaviors or has concerns about self-harm, here's what you can do:  · Talk about it- your feelings and reasons for harming yourself  · Remove any means that you might use to hurt yourself (examples: pills, rope, extension cords, firearm)  · Get professional help from the community (Mental Health, Substance Abuse, psychological counseling)  · Do not be alone:Call your Safe Contact- someone whom you trust who will be there for you.  · Call your local CRISIS HOTLINE 723-7823 or 069-165-2183  · Call your local Children's Mobile Crisis Response Team [place] (070) 550-5185 or www.DNAtriX  · Call the toll free National Suicide Prevention Hotlines   · National Suicide Prevention Lifeline 710-483-TDJP (6570)  · National Hope Line Network 800-SUICIDE (371-1783)

## 2019-04-30 NOTE — PROGRESS NOTES
DISCHARGE NOTE    Patient cleared for discharge home no services. Has singed script for Klonopin. Signed her discharge paperwork, understands all discharge instructions including changes to medications. IV removed, tele removed. Home medications returned to patient from pharmacy. Left at this time via wheelchair with her mother who is driving

## 2019-04-30 NOTE — DISCHARGE SUMMARY
Internal Medicine Discharge Summary  Note Author: Rebecca Lester M.D.       Name Kelly Arroyo     1980   Age/Sex 38 y.o. female   MRN [id number]         Admit Date:  2019       Discharge Date:   19    Service:   [facility]  Attending Physician(s):   Dr. Thompson  Senior Resident(s):   Dr. Lester  Luis Daniel Resident(s):   Dr. Mueller  PCP: LEXIE Franklin      Primary Diagnosis:   Lithium toxicity    Secondary Diagnoses:                Principal Problem:    Lithium toxicity  Active Problems:    Acute kidney injury     Acute metabolic encephalopathy    Hypokalemia    Bipolar disorder     Chronic back pain     History of iron deficiency anemia     History of suicidal tendencies     Angiomyolipoma of right kidney       Hospital Summary:       Patient is a 38-year old female with history of bipolar disorder who presented with altered mental status due to lithium toxicity, she also had acute renal failure.  Psychiatrist had just added lithium to her regimen.  She endorsed recent increased energy, insomnia and racing thoughts.  She states that she might have taken more than prescribed, but denies intentional overdose or suicidal ideation.  She has had a long history of bipolar depression and anxiety.  She has had multiple suicide attempts in the past.  Initially patient was placed on a legal hold per psychiatry while she was given IV fluids for renal failure and her lithium levels slowly decreased. EEG normal. She is now cleared for discharge from a medical and psychiatric standpoint.  She no longer is encephalopathic from lithium. Renal injury now resolved.   She will be following up with psychiatry in 2 weeks.  She will be discharged with Seroquel 400 mg p.o. at bedtime for mood and Klonopin 1 mg p.o. daily for anxiety/mood.  Of note, during admission patient was found to have iron deficiency anemia and low vitamin B12.  She was started on supplements for both  of these.  Her celiac panel was negative.  PCP to follow and workup.  She may need fecal occult blood testing.      Consultants:     Psychiatry    Procedures:        None    Imaging/ Testing:      DX-CHEST-PORTABLE (1 VIEW)   Final Result         1. No acute cardiopulmonary abnormalities are identified.      CT-HEAD W/O   Final Result         1. No acute intracranial abnormality. No evidence of acute intracranial hemorrhage or mass lesion.               US-FOREIGN FILM ULTRASOUND   Final Result          Discharge Medications:         Medication Reconciliation: Completed       Medication List      START taking these medications      Instructions   cyanocobalamin 500 MCG Tabs  Commonly known as:  VITAMIN B-12   Take 1 Tab by mouth every day.  Dose:  500 mcg     ferrous sulfate 325 (65 Fe) MG tablet  Start taking on:  4/30/2019   Take 1 Tab by mouth every morning with breakfast.  Dose:  325 mg        CHANGE how you take these medications      Instructions   clonazePAM 1 MG Tabs  Start taking on:  4/30/2019  What changed:  · how much to take  · how to take this  · when to take this  · additional instructions  Commonly known as:  KLONOPIN   Take 1 Tab by mouth every day for 20 days.  Dose:  1 mg     quetiapine 400 MG tablet  What changed:  · how much to take  · when to take this  · Another medication with the same name was removed. Continue taking this medication, and follow the directions you see here.  Commonly known as:  SEROQUEL   Take 1 Tab by mouth every evening.  Dose:  400 mg        CONTINUE taking these medications      Instructions   gabapentin 300 MG Caps  Commonly known as:  NEURONTIN   Take 300 mg by mouth every bedtime.  Dose:  300 mg        STOP taking these medications    lithium carbonate 300 MG capsule            Disposition:   Home    Diet:   Healthy    Activity:   As tolerated    Instructions:      Please come to ED if any thoughts of harming self or others.  The patient was instructed to return to  "the ER in the event of worsening symptoms. I have counseled the patient on the importance of compliance and the patient has agreed to proceed with all medical recommendations and follow up plan indicated above.   The patient understands that all medications come with benefits and risks. Risks may include permanent injury or death and these risks can be minimized with close reassessment and monitoring.        Primary Care Provider:      Discharge summary faxed to primary care provider:  Completed  Copy of discharge summary given to the patient: Deferred      Follow up appointment details :      Future Appointments  Date Time Provider Department Center   5/15/2019 4:30 PM Yaima Buckley M.D. [facility] None   5/15/2019 5:00 PM LEXIE Alexandre [facility] None     [facility]al [facility]  [address], [address]  [facility] [place] [address] [phone]    [facility]  contacted office and they will call to set appointment. If you do not hear from them please call to schedule your appointment . Thank you      Pending Studies:        none    Time spent on discharge day patient visit, preparing discharge paperwork and arranging for patient follow up.    Summary of follow up issues:   Bipolar management.   Iron and vitamin B12 deficiency work up.     Discharge Time (Minutes) :    50min.  Hospital Course Type:  Inpatient Stay >2 midnights      Condition on Discharge    ______________________________________________________________________    Interval history/exam for day of discharge:    No events overnight.  Patient is doing well.  No thoughts of harming herself.  Psychiatry is cleared for discharge follow-up with them in 2 weeks.    /92   Pulse 86   Temp 37.2 °C (98.9 °F) (Temporal)   Resp 16   Ht 1.778 m (5' 10\")   Wt 110.3 kg (243 lb 2.7 oz)   LMP 04/14/2019   SpO2 96%   Breastfeeding? No   BMI 34.89 kg/m²     Physical Exam  Constitutional: She is oriented to person, place, and time.   No acute " distress, mental status improved, AAOx4   HENT:   Head: Normocephalic and atraumatic.   Eyes: Pupils are equal, round, and reactive to light. Conjunctivae and EOM are normal. Right eye exhibits no discharge. Left eye exhibits no discharge. Right eye exhibits no nystagmus. Left eye exhibits no nystagmus.   Neck: Normal range of motion. Neck supple.   Cardiovascular: Normal rate, regular rhythm, normal heart sounds and intact distal pulses.    Pulmonary/Chest: Effort normal and breath sounds normal. No respiratory distress. She has no wheezes.   Abdominal: Soft. Bowel sounds are normal. She exhibits no distension (No ascitis). There is no tenderness. There is no guarding.   Musculoskeletal: Normal range of motion. She exhibits no edema or tenderness.   Neurological: She is alert and oriented to person, place, and time. She has normal sensation, normal strength and normal reflexes. No cranial nerve deficit. She exhibits normal muscle tone (Slightly increased tone). She has a normal Cerebellar Exam and a normal Romberg Test.   Skin: Skin is dry. No erythema.   Psychiatric: Affect and judgment normal.   Strongly denies any SI/HI       Most Recent Labs:    Lab Results   Component Value Date/Time    WBC 6.6 04/26/2019 04:42 AM    RBC 3.62 (L) 04/26/2019 04:42 AM    HEMOGLOBIN 9.8 (L) 04/26/2019 04:42 AM    HEMATOCRIT 30.6 (L) 04/26/2019 04:42 AM    MCV 84.5 04/26/2019 04:42 AM    MCH 27.1 04/26/2019 04:42 AM    MCHC 32.0 (L) 04/26/2019 04:42 AM    MPV 11.4 04/26/2019 04:42 AM    NEUTSPOLYS 71.60 04/26/2019 04:42 AM    LYMPHOCYTES 13.10 (L) 04/26/2019 04:42 AM    MONOCYTES 8.30 04/26/2019 04:42 AM    EOSINOPHILS 6.40 04/26/2019 04:42 AM    BASOPHILS 0.30 04/26/2019 04:42 AM      Lab Results   Component Value Date/Time    SODIUM 140 04/29/2019 12:08 AM    POTASSIUM 3.8 04/29/2019 12:08 AM    CHLORIDE 118 (H) 04/29/2019 12:08 AM    CO2 18 (L) 04/29/2019 12:08 AM    GLUCOSE 107 (H) 04/29/2019 12:08 AM    BUN 5 (L) 04/29/2019  12:08 AM    CREATININE 0.73 04/29/2019 12:08 AM      Lab Results   Component Value Date/Time    ALTSGPT 21 04/26/2019 04:42 AM    ASTSGOT 23 04/26/2019 04:42 AM    ALKPHOSPHAT 102 (H) 04/26/2019 04:42 AM    TBILIRUBIN 0.4 04/26/2019 04:42 AM    LIPASE 204 08/17/2018 10:20 AM    ALBUMIN 3.6 04/26/2019 04:42 AM    GLOBULIN 1.7 (L) 04/26/2019 04:42 AM    INR 1.13 02/12/2019 12:34 PM     Lab Results   Component Value Date/Time    PROTHROMBTM 11.6 02/12/2019 12:34 PM    INR 1.13 02/12/2019 12:34 PM

## 2019-08-14 PROBLEM — F31.70 BIPOLAR DISORDER IN PARTIAL REMISSION (HCC): Status: ACTIVE | Noted: 2019-02-27

## 2019-10-21 PROBLEM — F33.1 MODERATE EPISODE OF RECURRENT MAJOR DEPRESSIVE DISORDER (HCC): Status: ACTIVE | Noted: 2019-10-21

## 2019-11-27 PROBLEM — F31.9 AFFECTIVE PSYCHOSIS, BIPOLAR (HCC): Status: ACTIVE | Noted: 2019-11-27

## 2019-12-24 PROBLEM — N62 LARGE BREASTS: Status: ACTIVE | Noted: 2019-12-24

## 2019-12-24 PROBLEM — M54.16 LUMBAR RADICULOPATHY: Status: ACTIVE | Noted: 2019-12-24

## 2019-12-24 PROBLEM — M51.36 DDD (DEGENERATIVE DISC DISEASE), LUMBAR: Status: ACTIVE | Noted: 2019-12-24

## 2019-12-24 PROBLEM — M51.26 HNP (HERNIATED NUCLEUS PULPOSUS), LUMBAR: Status: ACTIVE | Noted: 2019-12-24

## 2020-01-29 PROBLEM — F31.70 BIPOLAR DISORDER IN PARTIAL REMISSION (HCC): Status: RESOLVED | Noted: 2019-02-27 | Resolved: 2020-01-29

## 2020-06-17 PROBLEM — F33.1 MODERATE EPISODE OF RECURRENT MAJOR DEPRESSIVE DISORDER (HCC): Status: RESOLVED | Noted: 2019-10-21 | Resolved: 2020-06-17

## 2020-06-17 PROBLEM — F31.62 BIPOLAR DISORDER, CURRENT EPISODE MIXED, MODERATE (HCC): Status: RESOLVED | Noted: 2019-03-05 | Resolved: 2020-06-17

## 2020-06-17 PROBLEM — F31.9 AFFECTIVE PSYCHOSIS, BIPOLAR (HCC): Status: RESOLVED | Noted: 2019-11-27 | Resolved: 2020-06-17

## 2021-03-01 PROBLEM — Z72.0 TOBACCO USE: Status: ACTIVE | Noted: 2021-03-01

## 2021-10-20 NOTE — ADDENDUM NOTE
Encounter addended by: Stoney Kwon M.D. on: 10/20/2021 7:04 AM   Actions taken: Delete clinical note